# Patient Record
Sex: FEMALE | Race: WHITE | NOT HISPANIC OR LATINO | Employment: STUDENT | URBAN - METROPOLITAN AREA
[De-identification: names, ages, dates, MRNs, and addresses within clinical notes are randomized per-mention and may not be internally consistent; named-entity substitution may affect disease eponyms.]

---
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
## 2024-01-08 ENCOUNTER — VIRTUAL VISIT (OUTPATIENT)
Dept: GASTROENTEROLOGY | Facility: CLINIC | Age: 19
End: 2024-01-08
Attending: PHYSICIAN ASSISTANT
Payer: COMMERCIAL

## 2024-01-08 DIAGNOSIS — K59.09 CHRONIC CONSTIPATION: ICD-10-CM

## 2024-01-08 PROCEDURE — 99213 OFFICE O/P EST LOW 20 MIN: CPT | Mod: 95 | Performed by: PHYSICIAN ASSISTANT

## 2024-01-08 RX ORDER — LUBIPROSTONE 24 UG/1
24 CAPSULE ORAL 2 TIMES DAILY WITH MEALS
Qty: 180 CAPSULE | Refills: 1 | Status: SHIPPED | OUTPATIENT
Start: 2024-01-08 | End: 2024-03-14

## 2024-01-08 NOTE — PROGRESS NOTES
GASTROENTEROLOGY Follow-up VIDEO VISIT    CC/REFERRING MD:    Sabi Maher    REASON FOR CONSULTATION:   Ishmael Ceballos for   Chief Complaint   Patient presents with    RECHECK       HISTORY OF PRESENT ILLNESS:    Grecia Lombardo is a 18 year old female who is being evaluated via a billable video visit for follow-up.  To review, I have been following patient for chronic constipation for several months now.  She had failed over-the-counter laxative therapies and so we started her on standard dose Linzess earlier this year.  She had some improvement but there is still room for improvement and so we increased to maximum dose.  At last visit, she had really seen better stool output on high-dose Linzess and so we transitioned to high-dose Amitiza.  Things are about the same, stools every 2 to 3 days, typically formed, but not quite where she is hoping to get her stool pattern.  No other new symptoms to report or other concerns.      I have reviewed and updated the patient's Past Medical History, Social History, Family History and Medication List.    Exam:    General appearance:  Healthy appearing adult, in no acute distress  Eyes:  Sclera anicteric, Pupils round and reactive to light  Ears, nose, mouth and throat:  No obvious external lesions of ears and nose.  Hearing intact  Neck:  Symmetric, No obvious external lesions  Respiratory:  Normal respiration, no use of accessory muscles   MSK:  No visual upper extremity, neck or facial muscle atrophy  ABD:  No visual abdominal distention, no audible borborygmi  Skin:  No rashes or jaundice   Psychiatric:  Oriented to person, place and time, Appropriate mood and affect.   Neurologic:  Peripheral muscle function and dexterity appear to be intact      PERTINENT STUDIES have been reviewed.    ASSESSMENT/PLAN:    Grecia Lombardo is a 18 year old female who presents for follow up of chronic constipation.  Marginal improvement with secretory therapy, now on  high-dose Amitiza.  She is tolerating well for the most part, so we will continue on it and I will add an adjunctive, either MiraLAX or senna, patient's choice.  I did also offer a pelvic floor physical therapy, as we reviewed that pelvic floor dysfunction is common among patients with chronic constipation.  She will consider this.  Okay to follow-up with me in 6 weeks, sooner as needed.  If not any better at that point, we did discuss some alternative medication options, such as prucalopride, but reviewed that some of the other medication options carry more significant risks.  We did also review considering additional diagnostic testing.  I still think tests like CT and colonoscopy are probably of low yield, but we might want to consider them.  We also discussed motility testing, such as anorectal manometry and balloon defecography.  This could be accomplished at the pelvic floor center.  I will see patient in 6 weeks and we will review all this if needed.    1. Chronic constipation  - Physical Therapy Referral; Future  - lubiprostone (AMITIZA) 24 MCG capsule; Take 1 capsule (24 mcg) by mouth 2 times daily (with meals)  Dispense: 180 capsule; Refill: 1      Video-Visit Details    Video Visit Time: 10 minutes    Type of service:  Video Visit    Originating Location (pt. Location): Home    Distant Location (provider location):  Off-site    Platform used for Video Visit: momondo    A total of 16 minutes was spent with reviewing the chart, discussing with the patient, documentation and coordination of care.    Ishmael Ceballos PA-C  Division of Gastroenterology, Hepatology, and Nutrition  [facility]  [phone]    RTC 6 weeks

## 2024-01-08 NOTE — NURSING NOTE
Is the patient currently in the state of [place]? YES    Visit mode:VIDEO    If the visit is dropped, the patient can be reconnected by: VIDEO VISIT: Text to cell phone:   Telephone Information:   Mobile [phone]       Will anyone else be joining the visit? NO  (If patient encounters technical issues they should call [phone]    How would you like to obtain your AVS? MyChart    Are changes needed to the allergy or medication list? No    Reason for visit: RECHECK    Aria CHUF

## 2024-01-08 NOTE — PATIENT INSTRUCTIONS
It was nice seeing you again today.  As we discussed, our plan will be to have you continue on Amitiza 24 mcg twice daily.  I sent a new prescription to your pharmacy.  I think you would benefit from using one of the over-the-counter laxatives on a daily basis for adjunctive therapy as well.  I would pick either 1 capful MiraLAX daily or 1 to 2 tablets of senna daily.  The goal will be to have more frequent stool output, on a daily or every other day basis, with continued formed but easily passed stools.  We also discussed the benefits of pelvic floor health and pelvic floor physical therapy.  I placed a referral for this.  You should be hearing from the physical therapy office within the next couple of days to offer some appointment times.    Lets recheck on things in 6 weeks or so.  You are welcome to reach out to me sooner over the MyChart with any questions.

## 2024-03-13 ENCOUNTER — VIRTUAL VISIT (OUTPATIENT)
Dept: GASTROENTEROLOGY | Facility: CLINIC | Age: 19
End: 2024-03-13
Attending: PHYSICIAN ASSISTANT
Payer: COMMERCIAL

## 2024-03-13 DIAGNOSIS — K59.09 CHRONIC CONSTIPATION: Primary | ICD-10-CM

## 2024-03-13 PROCEDURE — 99213 OFFICE O/P EST LOW 20 MIN: CPT | Mod: 95 | Performed by: PHYSICIAN ASSISTANT

## 2024-03-13 ASSESSMENT — PAIN SCALES - GENERAL: PAINLEVEL: NO PAIN (0)

## 2024-03-13 NOTE — PROGRESS NOTES
GASTROENTEROLOGY Follow-up VIDEO VISIT    CC/REFERRING MD:    Sabi Maher    REASON FOR CONSULTATION:   Ishmael Ceballos for   Chief Complaint   Patient presents with    Follow Up     2 month follow up for chronic constipation.       HISTORY OF PRESENT ILLNESS:    Grecia Lombardo is a 19 year old female who is being evaluated via a billable video visit for follow-up.  Most recent visit with me was a couple months ago.  To review, I have been following this patient for chronic constipation for the past several months.  She had failed over-the-counter laxative therapy so we started her on standard of Linzess this past fall.  There was some initial improvement but it seemed to decrease after a while, despite increasing to high-dose.  From there, we transitioned to high-dose Amitiza.  Again, initially had some improvement, but then her pattern reverted back to having stools every 2 to 3 days.  At last visit, I had recommended adding an adjunctive on, such as MiraLAX or senna.  Despite this, she still has an adequate stool output.  She is more interested in trying to treat the problem holistically with dietary or lifestyle modification.  We had previously discussed pelvic floor testing and considering some additional diagnostic testing such as CT or colonoscopy.      I have reviewed and updated the patient's Past Medical History, Social History, Family History and Medication List.    Exam:    General appearance:  Healthy appearing adult, in no acute distress  Eyes:  Sclera anicteric, Pupils round and reactive to light  Ears, nose, mouth and throat:  No obvious external lesions of ears and nose.  Hearing intact  Neck:  Symmetric, No obvious external lesions  Respiratory:  Normal respiration, no use of accessory muscles   MSK:  No visual upper extremity, neck or facial muscle atrophy  ABD:  No visual abdominal distention, no audible borborygmi  Skin:  No rashes or jaundice   Psychiatric:  Oriented to  person, place and time, Appropriate mood and affect.   Neurologic:  Peripheral muscle function and dexterity appear to be intact      PERTINENT STUDIES have been reviewed.    ASSESSMENT/PLAN:    Grecia Lombardo is a 19 year old female who presents for follow up of chronic constipation.  Unfortunately, treatment with a few different secretagogues have been mostly ineffective.  At this point, she has more interested in pursuing dietary modification, so we will refer to nutrition to review high-fiber diet and otherwise to improve stool output.  Additional options could include pelvic floor physical therapy.  Will plan for follow-up in a couple months.  If dietary changes do not seem to be very effective, I would consider some pelvic floor testing and consider cross-sectional abdominal imaging and possibly colonoscopy.  Patient stated understanding of plan and I will see her in a couple months, sooner as needed.    1. Chronic constipation  - Nutrition Referral; Future      Video-Visit Details    Video Visit Time: 7 minutes    Type of service:  Video Visit    Originating Location (pt. Location): Home    Distant Location (provider location):  On-site    Platform used for Video Visit: William    A total of 13 minutes was spent with reviewing the chart, discussing with the patient, documentation and coordination of care.    Ishmael Ceballos PA-C  Division of Gastroenterology, Hepatology, and Nutrition  [facility]  [phone]    RT 2 months

## 2024-03-13 NOTE — NURSING NOTE
Chief Complaint   Patient presents with    Follow Up     2 month follow up for chronic constipation.     She requests these members of her care team be copied on today's visit information:  PCP: Sabi Maher    There were no vitals filed for this visit.  There is no height or weight on file to calculate BMI.    Medications were reconciled.        Saige Arredondo, CMA

## 2024-03-14 ENCOUNTER — OFFICE VISIT (OUTPATIENT)
Dept: FAMILY MEDICINE | Facility: CLINIC | Age: 19
End: 2024-03-14
Payer: COMMERCIAL

## 2024-03-14 VITALS
HEART RATE: 109 BPM | DIASTOLIC BLOOD PRESSURE: 78 MMHG | TEMPERATURE: 98.2 F | RESPIRATION RATE: 12 BRPM | HEIGHT: 68 IN | BODY MASS INDEX: 26.55 KG/M2 | OXYGEN SATURATION: 99 % | SYSTOLIC BLOOD PRESSURE: 121 MMHG | WEIGHT: 175.2 LBS

## 2024-03-14 DIAGNOSIS — Z00.00 ANNUAL PHYSICAL EXAM: Primary | ICD-10-CM

## 2024-03-14 DIAGNOSIS — N92.1 MENORRHAGIA WITH IRREGULAR CYCLE: ICD-10-CM

## 2024-03-14 DIAGNOSIS — Z11.3 SCREENING FOR STDS (SEXUALLY TRANSMITTED DISEASES): ICD-10-CM

## 2024-03-14 PROBLEM — J35.01 CHRONIC TONSILLITIS: Status: RESOLVED | Noted: 2022-01-07 | Resolved: 2024-03-14

## 2024-03-14 PROCEDURE — 99395 PREV VISIT EST AGE 18-39: CPT | Performed by: FAMILY MEDICINE

## 2024-03-14 PROCEDURE — 87591 N.GONORRHOEAE DNA AMP PROB: CPT | Performed by: FAMILY MEDICINE

## 2024-03-14 PROCEDURE — 87491 CHLMYD TRACH DNA AMP PROBE: CPT | Performed by: FAMILY MEDICINE

## 2024-03-14 RX ORDER — IBUPROFEN 600 MG/1
600 TABLET, FILM COATED ORAL EVERY 6 HOURS PRN
COMMUNITY

## 2024-03-14 RX ORDER — CLINDAMYCIN PHOSPHATE 10 UG/ML
LOTION TOPICAL
COMMUNITY

## 2024-03-14 RX ORDER — NORGESTIMATE AND ETHINYL ESTRADIOL 0.25-0.035
1 KIT ORAL DAILY
Qty: 84 TABLET | Refills: 3 | Status: SHIPPED | OUTPATIENT
Start: 2024-03-14

## 2024-03-14 RX ORDER — HYDROCODONE BITARTRATE AND ACETAMINOPHEN 5; 325 MG/1; MG/1
1 TABLET ORAL EVERY 6 HOURS PRN
COMMUNITY
End: 2024-03-14

## 2024-03-14 RX ORDER — TRETINOIN 0.25 MG/G
CREAM TOPICAL
COMMUNITY

## 2024-03-14 NOTE — PROGRESS NOTES
"  Assessment & Plan     Annual physical exam  Advised patient to continue with healthy lifestyle.    Screening for STDs (sexually transmitted diseases)  Advised patient to continue using condoms.  - Chlamydia trachomatis/Neisseria gonorrhoeae by PCR- VAGINAL SELF-SWAB; Future    Menorrhagia with irregular cycle  Controlled.  Refill Sprintec.  - SPRINTEC 28 0.25-35 MG-MCG tablet; Take 1 tablet by mouth daily            BMI  Estimated body mass index is 26.64 kg/m  as calculated from the following:    Height as of this encounter: 1.727 m (5' 8\").    Weight as of this encounter: 79.5 kg (175 lb 3.2 oz).   Weight management plan: Discussed healthy diet and exercise guidelines            Subjective   Grecia is a 19 year old, presenting for the following health issues:  Contraception (No concerns. Med refill. )      3/14/2024    10:28 AM   Additional Questions   Roomed by Nicholas RAMIREZ (MA Apprentice) & Area F. ()     Contraception    History of Present Illness       Reason for visit:  Check up    She eats 2-3 servings of fruits and vegetables daily.She consumes 0 sweetened beverage(s) daily.She exercises with enough effort to increase her heart rate 60 or more minutes per day.  She exercises with enough effort to increase her heart rate 5 days per week.   She is taking medications regularly.     Patient is a college student at [employer].  She walks 3 miles per day to get from her apartment to class at home.  She goes to the gym a few times a week to do stairstepper and weights.  At home she will do Pilates 1-2 times a week.    Menorrhagia: When patient started having her periods around the age of 12 she bled continuously for 7 months that caused anemia, so she had to be started on Sprintec, which has been working well for her.  She is here requesting a refill.  She is sexually active and states that she uses condoms every time.  Last chlamydia screening was 2 years ago.              Review of Systems  Constitutional, " "HEENT, cardiovascular, pulmonary, gi and gu systems are negative, except as otherwise noted.      Objective    /78 (BP Location: Left arm, Patient Position: Sitting, Cuff Size: Adult Regular)   Pulse 109   Temp 98.2  F (36.8  C) (Oral)   Resp 12   Ht 1.727 m (5' 8\")   Wt 79.5 kg (175 lb 3.2 oz)   LMP 03/06/2024 (Exact Date)   SpO2 99%   BMI 26.64 kg/m    Body mass index is 26.64 kg/m .  Physical Exam   GENERAL: alert and no distress  EYES: Eyes grossly normal to inspection, PERRL and conjunctivae and sclerae normal  HENT: ear canals and TM's normal, nose and mouth without ulcers or lesions  NECK: no adenopathy, no asymmetry, masses, or scars  RESP: lungs clear to auscultation - no rales, rhonchi or wheezes  CV: regular rate and rhythm, normal S1 S2, no S3 or S4, no murmur, click or rub, no peripheral edema  ABDOMEN: soft, nontender, no hepatosplenomegaly, no masses and bowel sounds normal  MS: no gross musculoskeletal defects noted, no edema  SKIN: no suspicious lesions or rashes  NEURO: Normal strength and tone, mentation intact and speech normal  PSYCH: mentation appears normal, affect normal/bright            Signed Electronically by: Francisco Kapadia MD    "

## 2024-03-15 ENCOUNTER — VIRTUAL VISIT (OUTPATIENT)
Dept: NUTRITION | Facility: CLINIC | Age: 19
End: 2024-03-15
Attending: PHYSICIAN ASSISTANT
Payer: COMMERCIAL

## 2024-03-15 DIAGNOSIS — K59.09 CHRONIC CONSTIPATION: Primary | ICD-10-CM

## 2024-03-15 LAB
C TRACH DNA SPEC QL PROBE+SIG AMP: NEGATIVE
N GONORRHOEA DNA SPEC QL NAA+PROBE: NEGATIVE

## 2024-03-15 PROCEDURE — 97802 MEDICAL NUTRITION INDIV IN: CPT | Mod: 95 | Performed by: DIETITIAN, REGISTERED

## 2024-03-15 NOTE — PROGRESS NOTES
Medical Nutrition Therapy  Visit Type: Initial assessment and intervention    Visit Details    How would you like to obtain your AVS? MyChart  If the correspondence for visit is dropped, how would you like your dietitian to reconnect with you:   call back by phone? Yes    Text to cell phone: [phone]  Will anyone else be joining your video visit or telephone call? No  {If patient encounters technical issues they should call [phone] :74    Type of service:  Video Visit     Start Time: 12:33 PM    End Time:1:36 PM    Originating Location (pt. Location): Home    Distant Location (provider location):  [facility] Nutrition Department -remote/offsite    Platform used for Video Visit: William      Referring Provider: Ishmael Ceballos  [facility]     REASON FOR REFERRAL:   Grecia Lombardo is a 19 year old female who is interested in Medical Nutrition Therapy (MNT) and education related to weight management and constipation.     She is accompanied by self.     Switched meds 4-5x over thel ast year and hasn't seen improvement with digestion and wants more natural ways to treat her issues. She was bleeding for 7 months monthly in 8th grade her period didn't stop and started supplement to avoid blood transfusion. Not having heavy flows now. In highschool would vape and stopped in college and was eating more dorm food and gaining weight. Noticed now that as she got her own apartment started losing weight and wants to feel more healthy no weight goal.      Takes brand one a day multi. Target generic brand taking vitron-c iron 65mg daily since 13, nature made omega 3, magnesium 400mg and biotin natural. Not strictly lacto-ovo vegan primarily but once every 6 months or so will have poultry. Sometimes ground beef or turkey in tacos every 4 months or so.      https://www.K[employer]ist/vitamins/vitamins-for-women/vitacraves-gummy-womens-multivitamin    https://www.[employer].com/zscupb-j-cklawm-ingredients-and-other-product-information#ingredients    Discussed discontinuing iron, magnesium and biotin     Breakfast; marni seed pudding with fruit or eggs with cottage cheese     Hummus with carrots lunch or protein yogurt for marni seed pudding or string cheese     Dinner: tofu and beans or quinoa salad with cucumbers or tomatoes, peppers beans and tofu   NUTRITION ASSESSMENT:       3/15/2024    12:10 PM   Nutritional Goals   Nutritional Goal Create a plan to lose weight;Manage Digestive Issues;Increase energy;What to eat for my specific health concerns             No data to display                Failed to redirect to the Timeline version of the [employer] SmartLink.        No data to display                   3/15/2024    12:10 PM   Gastrointestinal   Constipation Current   Nausea or Vomiting Current   Diarrhea Current   Gas/bloating Current            No data to display                    No data to display                    No data to display                    No data to display                    No data to display                   3/15/2024    12:10 PM   Psychological   Depression/Anxiety Past           No data to display                   3/15/2024    12:10 PM   Womens Health Assessment   Hysterectomy No   I am pregnant.  No   I am breastfeeding. No   I want to become pregnant within the next year . No   What do you use for contraception?  condoms;birth control pill   Any problems with current birth control method?   No   Date of last menstrual period 3/10/2024   Are your periods irregular? No   Days from the start of one period to the next. 28   Days of Menstral Flow 4   Associated with menstral periods. Heavy bleeding;Water retention;Diarrhea/constipation   Are you officially in menopause? (no period for one year or longer)  No        Past Medical History:  Past Medical  History:   Diagnosis Date    Menorrhagia with irregular cycle     Improved with OCP.  Evaluation by Dr. Benjamin at [facility] at age 13.     Visual impairment     Drusen on opthalmology exam, normal opthalmologic ultrasound       Previous Surgeries:   Past Surgical History:   Procedure Laterality Date    TONSILLECTOMY  2021        Family History:  Family History   Problem Relation Age of Onset    No Known Problems Mother     No Known Problems Father         Lifestyle History:      3/15/2024    12:10 PM   Lifestyle   Do you feel your life is stressful right now?  Yes   What is the cause(s) of stress in your life?  Multi-tasking and multiple deadlines to meet   Are you currently implementing any strategies to help manage stress? Yes   What are you doing to manage stress?  Breathing exercises;Movement and exercise;Counseling;Take time for self        Exercise History:      3/15/2024    12:10 PM   Exercise   Does your occupation require extended periods of sitting?  No   Does your occupation require extended periods of repetitive movements (ex: walking or lifting)?  No   Do you currently participate in any forms of exercise? Yes   Check all the exercises you participate in: Walking;Yoga;Weight Lifting   How many times per week do you exercise? 5 times   How long do you usually exercise? 60 min        Sleep History:      3/15/2024    12:10 PM   Sleep   How many hours (on average) do you sleep per night? 4-6   What time do you turn off the lights? 10 PM   How long does it take for you to fall asleep? 1 hour   What time do you stop using electronic devices? 10 PM   What time do you wake up? 7 AM   When do you eat your first meal?  8 AM   Do you feel well-rested during the day?  No   Do you take naps?  No   Do you have a comfortable bedroom environment (cool, quiet, dark, etc)? Yes   Do you have a sleep routine/ ritual that you do before bed?  Yes   How many hours do you spend per day looking at a screen (TV, computer,  tablet and phone)? 5 to 6   Select all factors that apply to your current sleep habits: Difficulty falling asleep;Have difficulty waking up in the morning;Wake up in the middle of the night;Feel tired/sluggish/fatigued during the day;Have tried supplements (ie: melatonin) for sleep;Leg twitching at night        Nutrition History:      3/15/2024    12:10 PM   Nutrition   Have you ever had a nutrition consultation? No   Do you currently follow a special diet or nutritional program? No   What do you feel are the biggest barriers getting in the way of achieving you nutritional goals? Lack of nutrition knowledge;Stress;Transportation issues   Do you have any food allergies, sensitivities or intolerances?  No           3/15/2024    12:10 PM   Digestion   Do you experience stomach pains/cramping? 2-3 times per week   Do you experience bloating?  Daily   Do you experience gas?  Daily   Do you experience heartburn/acid reflux/indigestion? Never   How often do you have a bowel movement? Once per week or less   What is a typical bowel movement like for you? Select all that apply: Hard to pass;Varies a lot;Liquid;Alternates between loose and hard          3/15/2024    12:10 PM   Food Access:    Who does the grocery shopping? Self   How often is grocery shopping done? Every 2 weeks   Where do you usually receive your groceries from? Select all that apply: Target; [employer]   Do you read food labels? No   Who does the cooking? Select all that apply: Self   How many meals do you eat out per week?  0 to 1   What restaurants do you typically choose? [employer] (Chipotle, [employer], etc.)          3/15/2024    12:10 PM   Daily Patterns:   How many days per week do you have breakfast? 7   How many days per week do you have lunch? 5   How many days per week do you have dinner? 7   How many days per week do you have snacks? 6          3/15/2024    12:10 PM   Protein Intake:   How many times per day do you typically consume a protein  source(s)? 1   What types of protein do you currently eat?  Eggs;Beans;Tofu           3/15/2024    12:10 PM   Fat Intake:    How many times per day do you typically consume healthy fat(s)? 1   What types of health fats do you currently eat? Select all that apply:  Almonds;Cashews;Oscar seeds;Flax seeds;Peanut butter;Olive oil           3/15/2024    12:10 PM   Fruit Intake:    How many times per day do you typically consume fruits? 1   What types of fruit do currently eat? Apple;Banana;Blueberries;Cherries;Grapes;Kiwi;Mandarin oranges;Raspberries;Tangerines;Watermelon           3/15/2024    12:10 PM   Vegetable Intake:    How many times per day do you typically consume vegetables? 2   What types of vegetables do you currently eat? Beans;Broccoli;Cabbage;Carrots;Cauliflower;Celery;Corn;Cucumber;Greens (jaelyn, kale etc);Onions;Potato (baked, boiled, mashed, French fries);Sugar snap peas;Tomato          3/15/2024    12:10 PM   Grain Intake:    How many times per day do you typically consume grains? 1   What types of grains do currently eat? Select all that apply:  Quinoa (gluten free);White rice (gluten free);Bagel (non-gluten free);Breads (non-gluten free);Cereals (non-gluten free);Chips (non-gluten free);Crackers (non-gluten free);Muffins (non-gluten free);Pancakes/waffles (non-gluten free);Pasta (non-gluten free)           3/15/2024    12:10 PM   Dairy Intake:    How many times per day do you typically consume dairy? 1   What types of dairy do currently eat? Select all that apply:  Milk;Cheese;Yogurt           3/15/2024    12:10 PM   Non-Dairy Alternative Intake:    How many times per day do you typically consume non-dairy alternatives? 0   What types of non-dairy alternatives do currently eat? Select all that apply:  Urich milk;Oat milk           3/15/2024    12:10 PM   Sweets Intake:    How many times per day do you typically consume sweets? 1          3/15/2024    12:10 PM   Beverage Intake:    How many 8 oz  cups of water do you typically consume per day?  8 or more   How many 8 oz cups of caffeine do you typically consume per day?  1 to 2   How many drinks of alcohol do you typically consume per week (1 drink = 5 oz wine, 12 oz beer, 1.5 oz spirits)?   4 to 7           3/15/2024    12:10 PM   Lifestyle Recall:    What time did you wake up? 7 AM   What time did you go to sleep? 11 PM   What time did you have breakfast? 7-8 AM   Where did you have breakfast?  Home   What time did you have a morning snack? No snack   What time did you have lunch? 12-1 PM   Where did you have lunch?  School   What time did you have an afternoon snack? 4-5 PM   Where did you have your afternoon snack? Home   What time did you have dinner? 6-7 PM   Where did you have dinner?  Home   What time did you have an evening snack? 8-9 PM   Where did you have your evening snack? Home   What time of day did you exercise? 7 AM   Where did you exercise? Home          Additional concerns:    MEDICATIONS:  Current Outpatient Medications   Medication Sig Dispense Refill    biotin 1000 MCG TABS tablet       clindamycin (CLEOCIN T) 1 % external lotion APPLY TO FACE IN THE MORNING TOPICALLY      fish oil-omega-3 fatty acids 1000 MG capsule       ibuprofen (ADVIL/MOTRIN) 600 MG tablet Take 600 mg by mouth every 6 hours as needed      iron,carbonyl-vitamin C (VITRON-C) 65 mg iron- 125 mg TbEC [IRON,CARBONYL-VITAMIN C (VITRON-C) 65 MG IRON- 125 MG TBEC] Take by mouth.      multivitamin (MULTIPLE VITAMIN ORAL) [MULTIVITAMIN (MULTIPLE VITAMIN ORAL)]       ondansetron (ZOFRAN ODT) 4 MG ODT tab Take 1 tablet (4 mg) by mouth every 6 hours as needed for nausea or vomiting 12 tablet 0    SPRINTEC 28 0.25-35 MG-MCG tablet Take 1 tablet by mouth daily 84 tablet 3    tretinoin (RETIN-A) 0.025 % external cream 1 APPLICATION AT BEDTIME TOPICALLY TO FACE             3/15/2024    12:10 PM   Dietary Supplements   Individual Vitamin Supplements C;General multivitamin  "  Individual Mineral Supplements Iron;Magnesium         ALLERGIES:   No Known Allergies     .na  LABS:  Last Basic Metabolic Panel:  Lab Results   Component Value Date     02/10/2022     09/24/2021     08/26/2021      Lab Results   Component Value Date    POTASSIUM 3.4 02/10/2022    POTASSIUM 4.3 09/24/2021    POTASSIUM 4.0 08/26/2021     Lab Results   Component Value Date    CHLORIDE 102 02/10/2022    CHLORIDE 104 09/24/2021    CHLORIDE 107 08/26/2021     Lab Results   Component Value Date    WILNER 9.2 02/10/2022    WILNER 10.3 09/24/2021    WILNER 9.5 08/26/2021     Lab Results   Component Value Date    CO2 23 02/10/2022    CO2 25 09/24/2021    CO2 21 08/26/2021     Lab Results   Component Value Date    BUN 10 02/10/2022    BUN 7 09/24/2021    BUN 7 08/26/2021     Lab Results   Component Value Date    CR 0.68 02/10/2022    CR 0.63 09/24/2021    CR 0.67 08/26/2021     Lab Results   Component Value Date     02/10/2022    GLC 83 09/24/2021    GLC 65 08/26/2021       Last Glucose Profile:   No results found for: \"A1C\"    Last Lipid Profile:   Cholesterol   Date Value Ref Range Status   07/30/2020 180 (H) <=169 mg/dL Final     Direct Measure HDL   Date Value Ref Range Status   07/30/2020 78 >45 mg/dL Final     LDL Cholesterol Calculated   Date Value Ref Range Status   07/30/2020 90 <=109 mg/dL Final     Triglycerides   Date Value Ref Range Status   07/30/2020 61 <=89 mg/dL Final     No results found for: \"CHOLHDLRATIO\"    Most recent CBC:  Recent Labs   Lab Test 02/10/22  2055 01/27/22  1532 08/26/21  1019   WBC 15.2* 7.8 5.3   HGB 12.6 12.2 12.7   HCT 39.2 37.5 38.5    289 290     Most recent hepatic panel:  Recent Labs   Lab Test 08/26/21  1019   ALT 18   AST 20     Most recent creatinine:  Recent Labs   Lab Test 02/10/22  2055 09/24/21  0820   CR 0.68 0.63       No components found for: \"GFRESETIMATED\"LASTLAB(gfrestblack:1@  Lab Results   Component Value Date    ALBUMIN 3.8 08/26/2021 " "      Last Thyroid Profile:   TSH   Date Value Ref Range Status   03/15/2023 1.41 0.50 - 4.30 uIU/mL Final       Last Mineral Profile:   Ferritin   Date Value Ref Range Status   08/26/2021 27 6 - 40 ng/mL Final     Iron   Date Value Ref Range Status   06/05/2019 66 42 - 175 ug/dL Final       Autoimmune & Inflammatory   No results found for: \"CRP\"      Last Vitamin Profile:   No results found for: \"PWV182\", \"PLEE560\", \"NHDQ54LHYVE\", \"VITD3\", \"D2VIT\", \"D3VIT\", \"DTOT\", \"SI94826223\", \"[id number]\", \"[id number]\", \"FO80861255\", \"BF81735291\", \"[id number]\"    ANTHROPOMETRICS:  Vitals:   BP Readings from Last 1 Encounters:   03/14/24 121/78     Pulse Readings from Last 1 Encounters:   03/14/24 109     Estimated body mass index is 26.64 kg/m  as calculated from the following:    Height as of 3/14/24: 1.727 m (5' 8\").    Weight as of 3/14/24: 79.5 kg (175 lb 3.2 oz).    Wt Readings from Last 5 Encounters:   03/14/24 79.5 kg (175 lb 3.2 oz)   03/13/23 63.5 kg (140 lb) (71%, Z= 0.56)*   02/10/22 63.5 kg (140 lb) (75%, Z= 0.68)*   01/27/22 65.8 kg (145 lb) (80%, Z= 0.85)*   12/10/21 63 kg (138 lb 14.4 oz) (74%, Z= 0.65)*     * Growth percentiles are based on CDC (Girls, 2-20 Years) data.       NUTRITION DIAGNOSIS:   1. Micronutrient imbalances related to following lacto-ovo vegetarian diet and supplementing with possible un bioavailable nutrients (such as Vitamin C (as asorbic acid) 125 mg 140%  Elemental Iron 65mg (as carbonyl iron) as evidenced by constipation but normal iron levels (previous history of low iron in past).       NUTRITION INTERVENTION:   Nutrition Education (Application):  Core Food Plan Materials by [employer]     Discussed healthy fibers    Reviewed current diet and supplement brand intake for excess intake and not enough intake   Micronutrients seem to impact the flow of water into and out of the GI tract, which can affect the consistency of stool. Micronutrients may also affect the " rate of movement, or motility,    throughout the GI tract. Each specific micronutrient plays an important role in healthy digestion, so imbalances in any of them will affect the GI tract in a particular way, potentially leading to constipation.    Reviewed and planned out some recipes for breakfast and dinner      Discussed meal planning ayaz and foods on budget grocery shopping    Long Term Goals:   Goal: [place] Stool type 4 - formed and soft and decrease constipation   Goal: Maintain a healthy balanced diet - increase protein and address potential nutrient deficiencies following lacto-ovo vegetarian diet.         Short Term Goals:  Goal 1: Focus on having a healthy balanced breakfast daily with protein. Try the hemp protein powder for extra protein and fiber.   https://[employer]/collections/hemp-yeah-protein-powder/products/hemp-yeah-max-fiber-unsweetened    Incorporate a whole grain at breakfast ex oatmeal naturally gluten free with more healthy fats such as handful of walnuts and 1 fruit serving 15g of carbs such as blueberries. Try a lean protein on side such as 2 eggs, 2 turkey or chicken sausage.    Try a smoothie 1-2x a week for breakfast for the next six weeks see recipe discussed. add in unsweetened flax, hemp or coconut milk, 1 tbsp of healthy fat such as flax seed ground or marni seed ground. serving of fruit 1 cup of berries. Veggie (optional), 1 scoop of plant based protein powder or collagen powder, 1 serving of fruit and veggie such as kale, spinach, or veggie powder even spirulina     Avocado on slice of gluten free bread recommend [employer] Solution brand      https://[employer]/?[email or web address]     Birch rojo brand for pancakes - paleo  https://[employer]/collections/pancake-waffle-mix/products/paleo     Try siete soft taco shells with eggs and veggies in am for breakfast    https://[employer]/collections/tortillas/products/tsucpz-ehzfj-zluixmobd-6-pack     Goal 2:  Check out the meal planning ayaz - Try to plan two-three new dinners from mealime.com and make 4 servings so 2 can be leftovers for lunch.     Goal 3: Choose foods high in fiber: Aim for at least 5 grams of fiber per serving of food or a total of 25-35 grams fiber per day. Remember, when looking at the label, you can take the fiber away from the total carbs. Ex:15g of total carbs - 4g of fiber = 11g net carbs     Insoluble fiber acts like a bulky  inner broom,  sweeping out debris from the intestine and creating more motility and movement.      Soluble fiber attracts water and swells, creating a gel that slows digestion.  Also, slows the release of glucose from foods into the blood which stops spikes in blood sugar levels.  Soluble fiber traps toxins in the gut, helping to carry them to excretion and provides healthy bacteria in the digestive tract.      Goal 4: Recommend stopping supplements magnesium, iron, omega and get labs tested.   Start the following supplements to see if that helps with constipation.   One Multivitamin by Pure encapsulations - 1 capsule per day with food   Donora Naturals ultimate omega 3 -2 capsules daily with food   Start natural vitality calm magnesium - Add chamomile tea with magnesium at night before bed. Star with 1 tsp and increase as needed. Add another tsp in am to your smoothie before going to school.      Labs recommended:   Vitamin B1, vitamin B6, vitamin B12, vitamin D, calcium, iodine, iron, ferritin, and zinc in your blood to help detect any deficiencies common to a vegetarian diet.      How Do Micronutrient Imbalances Cause Constipation?  Micronutrients seem to impact the flow of water into and out of the GI tract, which can affect the consistency of stool. Micronutrients may also affect the rate of movement, or motility, throughout the GI tract. Each specific micronutrient plays  an important role in healthy digestion, so imbalances in any of them will affect the GI tract in a particular way, potentially leading to constipation.    Which Micronutrient Imbalances Are Associated With Constipation?  Micronutrients can be found in both low and high levels, and we see both excess and deficiencies of certain micronutrients leading to constipation.    Micronutrient Excess That Can Cause Constipation.  Micronutrient excess can cause constipation through various mechanisms such as reducing gut motility, altering the water balance in the intestine, and interfering with the absorption of other essential nutrients. Here are some examples of how specific micronutrients in excess can cause constipation:    Iron Excess  Iron is a mineral required for proper development and growth. Iron is essential for the creation of hemoglobin, the part of the red blood cell that carries oxygen around the body. Iron deficiencies can be common, especially in pregnant women, and supplementation of iron is often required. One of the most common side effects of iron supplementation is constipation, as it is thought that iron pulls water away from the GI tract, causing hard stools. Iron can come in many forms, with ferrous/ferric sulfate, ferrous/ferric gluconate, and ferrous/ferric fumarate being the most commonly found in supplements. However, other types of iron are available, including ferrous glycine sulfate (ferrous glycinate or ferrous bisglycinate), iron protein succinylate, and ferric citrate, and might be a better option as they are gentler on the GI tract and are not as constipating as the former types of iron mentioned.    Calcium Excess  Calcium is the most common mineral found in the body. It is essential for strong bones, muscular contractions, nerve signaling, hormone release, and blood flow. However, calcium's ability to contract muscles can act on the muscles of the GI tract and slow the transit of stool,  leading to constipation. Like iron, different types of calcium may affect the GI differently. Calcium in the form of calcium carbonate has been labeled as the most constipating type of calcium.    Vitamin D Excess  It is estimated that 40% of Americans are deficient in vitamin D. Vitamin D deficiency can lead to bone pain, impaired immune function, mood swings, low energy levels, and more. Because vitamin D is primarily made from the sun, vitamin D supplementation in northern regions is quite common. In cases of over-supplementation, excessive vitamin D can lead to hypercalcemia or too much calcium in the blood. As discussed above, elevated calcium can cause constipation, and thus elevated vitamin D can also lead to constipation.    Zinc Excess  Excess zinc intake can cause constipation by interfering with the absorption of other essential nutrients, such as copper and iron, which are important for maintaining bowel regularity. Sources of zinc include oysters, red meat, poultry, beans, nuts, and fortified cereals.    Micronutrient Deficiencies That Can Cause Constipation  Micronutrient deficiencies can cause constipation by affecting the proper functioning of the digestive system. Here are some examples of how specific micronutrient deficiencies can cause constipation:    Vitamin D Deficiency  While excess vitamin D can cause constipation, so can low vitamin D. Vitamin D may aid in gastric motility or movement of the muscles in the GI tract. Deficiencies, thus, may lead to the slowing of the movement in the GI, causing constipation.    Thiamine/B1 Deficiency  Thiamine, or vitamin B1, is essential for cellular growth and development. This vitamin is found in whole grains, nuts, seeds, pork, and legumes. Thiamine deficiencies are considered rare in the [place] but occasionally do occur. In the presence of a B1 deficiency, cells in the pancreas will greatly reduce the amount of digestive enzymes they release, which can  slow down the digestive tract and lead to constipation.    Potassium Deficiency  Potassium is a mineral used by every cell in the body, as it plays an important role in cellular fluidity and chemical signaling. A potassium deficiency can cause motility in the GI tract to slow, and in severe cases, it can cause it to stop completely.    Magnesium Deficiency  Magnesium is utilized in over 300 reactions in the body, making it an abundant mineral. Magnesium is widely used in the treatment of constipation, but a study on over 3,000 [place] women showed an association between low magnesium status and a higher prevalence of constipation. Magnesium helps to pull water into the stool, softening it while also stimulating contractions in the GI tract, causing movement of the stool.    Vitamin C Deficiency  Vitamin C deficiency can cause constipation by reducing the production of collagen, which is necessary for the proper functioning of the intestinal lining. Vitamin C also acts as a natural laxative by increasing the water content in the stool. Sources of vitamin C include citrus fruits, berries, kiwi, peppers, and broccoli.      Nutrition for Constipation  The Mediterranean Diet can be a great way to eat to avoid micronutrient deficiencies and relieve constipation. The Mediterranean Diet consists of whole foods, unprocessed grains, and healthy fats. It typically includes foods that contain an array of micronutrients, including fresh fruits and vegetables, legumes, herbs, nuts, seeds, olive oil, and fatty fish. Followers of the diet are encouraged to eat locally and seasonally, as foods consumed in this fashion tend to be more nutrient dense. The Mediterranean Diet is also high in fibrous foods, which can help to bulk stool and relieve constipation.    Hydration for Constipation  Water intake is essential to avoid constipation. Dehydration causes water to be pulled from the intestines and into the circulation, leaving hard,  dry stools. Additionally, water is needed to properly absorb micronutrients, which can, in turn, affect constipation.      Supplements for Constipation  Digestive enzymes are naturally produced by the stomach, liver, and pancreas and function to break down foods. If markers of digestion are low, digestive enzyme supplementation may be necessary to aid in the breakdown and absorption of food.    Intestinal permeability  L-glutamine is an amino acid and the primary fuel source for small intestine cells. Glutamine has been shown to strengthen the tight junctions that hold the intestinal cells together, lowering intestinal permeability.         NUTRITION RESOURCES:  IFM Core Packet - IFM Mediterranean diet   Functional Nutrition Fundamentals   Comprehensive Guide  Meal plan with recipes   [email or web address]  [email or web address]  [email or web address]  [email or web address]          PATIENT'S BEHAVIOR CHANGE GOALS:   See nutrition intervention for patient stated behavior change goals. AVS and nutrition handouts were sent to patient by [employer].     MONITOR / EVALUATE:  Registered Dietitian will monitor/evaluate the following:   Beliefs and attitudes related to food  Food and nutrition knowledge / skills  Food / Beverage / Nutrient intake   Pertinent Labs  Progress toward meeting stated nutrition-related goals  Readiness to change nutrition-related behaviors  Weight change  Digestion     COORDINATION OF CARE:  Follow up with gastroenterology      FOLLOW-UP:  Follow-up appointment scheduled on May 15th 10am video visit     Time spent in minutes: 61 minutes 4 units   Encounter: Individual    Brittni Lassiter RD, CLT, LD  Integrative Registered Dietitian

## 2024-03-15 NOTE — PATIENT INSTRUCTIONS
NUTRITION INTERVENTION:   Nutrition Education (Application):  Core Food Plan Materials by [employer] [place] [employer]     Discussed healthy fibers    Reviewed current diet and supplement brand intake for excess intake and not enough intake   Micronutrients seem to impact the flow of water into and out of the GI tract, which can affect the consistency of stool. Micronutrients may also affect the rate of movement, or motility,    throughout the GI tract. Each specific micronutrient plays an important role in healthy digestion, so imbalances in any of them will affect the GI tract in a particular way, potentially leading to constipation.    Reviewed and planned out some recipes for breakfast and dinner      Discussed meal planning ayaz and foods on budget grocery shopping    Long Term Goals:   Goal: [place] Stool type 4 - formed and soft and decrease constipation   Goal: Maintain a healthy balanced diet - increase protein and address potential nutrient deficiencies following lacto-ovo vegetarian diet.         Short Term Goals:  Goal 1: Focus on having a healthy balanced breakfast daily with protein. Try the hemp protein powder for extra protein and fiber.   https://[employer]/collections/hemp-yeah-protein-powder/products/hemp-yeah-max-fiber-unsweetened    Incorporate a whole grain at breakfast ex oatmeal naturally gluten free with more healthy fats such as handful of walnuts and 1 fruit serving 15g of carbs such as blueberries. Try a lean protein on side such as 2 eggs, 2 turkey or chicken sausage.    Try a smoothie 1-2x a week for breakfast for the next six weeks see recipe discussed. add in unsweetened flax, hemp or coconut milk, 1 tbsp of healthy fat such as flax seed ground or marni seed ground. serving of fruit 1 cup of berries. Veggie (optional), 1 scoop of plant based protein powder or collagen powder, 1 serving of fruit and veggie such as kale, spinach, or veggie powder even spirulina      Avocado on slice of gluten free bread recommend base culture brand      https://[email or web address]/?gclid=[email or web address]     Birch rojo brand for pancakes - paleo  https://[employer]collections/pancake-waffle-mix/products/paleo     Try siete soft taco shells with eggs and veggies in am for breakfast   https://[employer]/tortillas/products/oqhkmc-ddfad-ffilltwkw-6-pack     Goal 2:  Check out the meal planning ayaz - Try to plan two-three new dinners from mealime.com and make 4 servings so 2 can be leftovers for lunch.     Goal 3: Choose foods high in fiber: Aim for at least 5 grams of fiber per serving of food or a total of 25-35 grams fiber per day. Remember, when looking at the label, you can take the fiber away from the total carbs. Ex:15g of total carbs - 4g of fiber = 11g net carbs     Insoluble fiber acts like a bulky  inner broom,  sweeping out debris from the intestine and creating more motility and movement.      Soluble fiber attracts water and swells, creating a gel that slows digestion.  Also, slows the release of glucose from foods into the blood which stops spikes in blood sugar levels.  Soluble fiber traps toxins in the gut, helping to carry them to excretion and provides healthy bacteria in the digestive tract.      Goal 4: Recommend stopping supplements magnesium, iron, omega and get labs tested.   Start the following supplements to see if that helps with constipation.   One Multivitamin by Pure encapsulations - 1 capsule per day with food   [place] Naturals ultimate omega 3 -2 capsules daily with food   Start natural vitality calm magnesium - Add chamomile tea with magnesium at night before bed. Star with 1 tsp and increase as needed. Add another tsp in am to your smoothie before going to school.      Labs recommended:   Vitamin B1, vitamin B6, vitamin B12, vitamin D, calcium, iodine, iron,  ferritin, and zinc in your blood to help detect any deficiencies common to a vegetarian diet.      How Do Micronutrient Imbalances Cause Constipation?  Micronutrients seem to impact the flow of water into and out of the GI tract, which can affect the consistency of stool. Micronutrients may also affect the rate of movement, or motility, throughout the GI tract. Each specific micronutrient plays an important role in healthy digestion, so imbalances in any of them will affect the GI tract in a particular way, potentially leading to constipation.    Which Micronutrient Imbalances Are Associated With Constipation?  Micronutrients can be found in both low and high levels, and we see both excess and deficiencies of certain micronutrients leading to constipation.    Micronutrient Excess That Can Cause Constipation.  Micronutrient excess can cause constipation through various mechanisms such as reducing gut motility, altering the water balance in the intestine, and interfering with the absorption of other essential nutrients. Here are some examples of how specific micronutrients in excess can cause constipation:    Iron Excess  Iron is a mineral required for proper development and growth. Iron is essential for the creation of hemoglobin, the part of the red blood cell that carries oxygen around the body. Iron deficiencies can be common, especially in pregnant women, and supplementation of iron is often required. One of the most common side effects of iron supplementation is constipation, as it is thought that iron pulls water away from the GI tract, causing hard stools. Iron can come in many forms, with ferrous/ferric sulfate, ferrous/ferric gluconate, and ferrous/ferric fumarate being the most commonly found in supplements. However, other types of iron are available, including ferrous glycine sulfate (ferrous glycinate or ferrous bisglycinate), iron protein succinylate, and ferric citrate, and might be a better option as  they are gentler on the GI tract and are not as constipating as the former types of iron mentioned.    Calcium Excess  Calcium is the most common mineral found in the body. It is essential for strong bones, muscular contractions, nerve signaling, hormone release, and blood flow. However, calcium's ability to contract muscles can act on the muscles of the GI tract and slow the transit of stool, leading to constipation. Like iron, different types of calcium may affect the GI differently. Calcium in the form of calcium carbonate has been labeled as the most constipating type of calcium.    Vitamin D Excess  It is estimated that 40% of Americans are deficient in vitamin D. Vitamin D deficiency can lead to bone pain, impaired immune function, mood swings, low energy levels, and more. Because vitamin D is primarily made from the sun, vitamin D supplementation in northern regions is quite common. In cases of over-supplementation, excessive vitamin D can lead to hypercalcemia or too much calcium in the blood. As discussed above, elevated calcium can cause constipation, and thus elevated vitamin D can also lead to constipation.    Zinc Excess  Excess zinc intake can cause constipation by interfering with the absorption of other essential nutrients, such as copper and iron, which are important for maintaining bowel regularity. Sources of zinc include oysters, red meat, poultry, beans, nuts, and fortified cereals.    Micronutrient Deficiencies That Can Cause Constipation  Micronutrient deficiencies can cause constipation by affecting the proper functioning of the digestive system. Here are some examples of how specific micronutrient deficiencies can cause constipation:    Vitamin D Deficiency  While excess vitamin D can cause constipation, so can low vitamin D. Vitamin D may aid in gastric motility or movement of the muscles in the GI tract. Deficiencies, thus, may lead to the slowing of the movement in the GI, causing  constipation.    Thiamine/B1 Deficiency  Thiamine, or vitamin B1, is essential for cellular growth and development. This vitamin is found in whole grains, nuts, seeds, pork, and legumes. Thiamine deficiencies are considered rare in the [place] but occasionally do occur. In the presence of a B1 deficiency, cells in the pancreas will greatly reduce the amount of digestive enzymes they release, which can slow down the digestive tract and lead to constipation.    Potassium Deficiency  Potassium is a mineral used by every cell in the body, as it plays an important role in cellular fluidity and chemical signaling. A potassium deficiency can cause motility in the GI tract to slow, and in severe cases, it can cause it to stop completely.    Magnesium Deficiency  Magnesium is utilized in over 300 reactions in the body, making it an abundant mineral. Magnesium is widely used in the treatment of constipation, but a study on over 3,000 [place] women showed an association between low magnesium status and a higher prevalence of constipation. Magnesium helps to pull water into the stool, softening it while also stimulating contractions in the GI tract, causing movement of the stool.    Vitamin C Deficiency  Vitamin C deficiency can cause constipation by reducing the production of collagen, which is necessary for the proper functioning of the intestinal lining. Vitamin C also acts as a natural laxative by increasing the water content in the stool. Sources of vitamin C include citrus fruits, berries, kiwi, peppers, and broccoli.      Nutrition for Constipation  The Mediterranean Diet can be a great way to eat to avoid micronutrient deficiencies and relieve constipation. The Mediterranean Diet consists of whole foods, unprocessed grains, and healthy fats. It typically includes foods that contain an array of micronutrients, including fresh fruits and vegetables, legumes, herbs, nuts, seeds, olive oil, and fatty fish. Followers of the  diet are encouraged to eat locally and seasonally, as foods consumed in this fashion tend to be more nutrient dense. The Mediterranean Diet is also high in fibrous foods, which can help to bulk stool and relieve constipation.    Hydration for Constipation  Water intake is essential to avoid constipation. Dehydration causes water to be pulled from the intestines and into the circulation, leaving hard, dry stools. Additionally, water is needed to properly absorb micronutrients, which can, in turn, affect constipation.      Supplements for Constipation  Digestive enzymes are naturally produced by the stomach, liver, and pancreas and function to break down foods. If markers of digestion are low, digestive enzyme supplementation may be necessary to aid in the breakdown and absorption of food.    Intestinal permeability  L-glutamine is an amino acid and the primary fuel source for small intestine cells. Glutamine has been shown to strengthen the tight junctions that hold the intestinal cells together, lowering intestinal permeability.         NUTRITION RESOURCES:  IFM Core Packet - IFM Mediterranean diet   Functional Nutrition Fundamentals   Comprehensive Guide  Meal plan with recipes   [email or web address]  [email or web address]  [email or web address]/[email or web address]  [email or web address]

## 2024-03-18 DIAGNOSIS — K59.09 CHRONIC CONSTIPATION: Primary | ICD-10-CM

## 2024-05-15 ENCOUNTER — VIRTUAL VISIT (OUTPATIENT)
Dept: NUTRITION | Facility: CLINIC | Age: 19
End: 2024-05-15
Payer: COMMERCIAL

## 2024-05-15 DIAGNOSIS — K59.09 CHRONIC CONSTIPATION: Primary | ICD-10-CM

## 2024-05-15 PROCEDURE — 97803 MED NUTRITION INDIV SUBSEQ: CPT | Mod: 95 | Performed by: DIETITIAN, REGISTERED

## 2024-05-15 NOTE — PROGRESS NOTES
Medical Nutrition Therapy  Visit Type: Reassessment and intervention    Visit Details    How would you like to obtain your AVS? MyChart  If the correspondence for visit is dropped, how would you like your dietitian to reconnect with you:   call back by phone? Yes    Text to cell phone: [phone]  Will anyone else be joining your video visit or telephone call? No  {If patient encounters technical issues they should call [phone] :86    Type of service:  Video Visit     Start Time: 10:04 AM    End Time:10:34 AM    Originating Location (pt. Location): Home    Distant Location (provider location):  [facility] Nutrition Department -remote/offsite    Platform used for Video Visit: William      Referring Provider: Ishmael Ceballos  [facility]     REASON FOR REFERRAL:   Grecia Lombardo is a 19 year old female who is interested in Medical Nutrition Therapy (MNT) and education related to weight management and constipation.     She is accompanied by self.     Changes since previous consult Yes        Behavior Status:Improvement shown  Barriers Include: creating consistent eating habits and getting in adequate micronutrients such as iron and variety of fiber     Goals: What would you like to work on that will help you most over the next several weeks? Meal planning especially dinner and increasing healthy fats      Constipation and eating habits better now that she has gotten home and been away from college and starting to make the changes dicussed at initial. The resources from our last consult have been really helpful. She has been using the [employer] ayaz to help meal planning has been good. She would like to look at getting some labs on. She has noticed since stopping iron supplement she gets bruise that lasts a long time. She used to bruise before taking the iron supplement and in last month covered in bruse that dont go away quickly. Has protein powder at home and has started  implementing with breakfast. She has been trying to increase more protein from chicken. She feels more confident using it in meals. She has been alternating breakfast throughout the week between fruit smithies and egg/spinach tomato based muffins. Now being home her parents will make dinner and has been focusing on lunch. She has had more energy and more soft formed Bms. GI dr ordered labs but still need to get them taken to see if deficient. She has been increasing her fiber but unsure how much she is actually getting. She does know how to read the label.  Discussed different types of fiber and how much she should get in her day at least 25g and she has been getting apples, oatmeal, nuts daily. Discussed some foods that are high in iron to prevent deficiencies. Discussed switching to different iron supplement that doesn't cause constipation and adds vitamin C for better absorption.       Notes from 3/15/24  Switched meds 4-5x over thel ast year and hasn't seen improvement with digestion and wants more natural ways to treat her issues. She was bleeding for 7 months monthly in 8th grade her period didn't stop and started supplement to avoid blood transfusion. Not having heavy flows now. In highschool would vape and stopped in college and was eating more dorm food and gaining weight. Noticed now that as she got her own apartment started losing weight and wants to feel more healthy no weight goal.      Takes brand one a day multi. Target generic brand taking vitron-c iron 65mg daily since 13, nature made omega 3, magnesium 400mg and biotin natural. Not strictly lacto-ovo vegan primarily but once every 6 months or so will have poultry. Sometimes ground beef or turkey in tacos every 4 months or so.     https://www.[employer].WePlann/vitamins/vitamins-for-women/vitacraves-gummy-womens-multivitamin    https://www.hoohbe.com/fxwezi-c-ockbxn-ingredients-and-other-product-information#ingredients    Discussed discontinuing iron,  magnesium and biotin     Breakfast; marni seed pudding with fruit or eggs with cottage cheese     Hummus with carrots lunch or protein yogurt for marni seed pudding or string cheese     Dinner: tofu and beans or quinoa salad with cucumbers or tomatoes, peppers beans and tofu   NUTRITION ASSESSMENT:       3/15/2024    12:10 PM   Nutritional Goals   Nutritional Goal Create a plan to lose weight;Manage Digestive Issues;Increase energy;What to eat for my specific health concerns             No data to display                Failed to redirect to the Timeline version of the [employer] SmartLink.        No data to display                   3/15/2024    12:10 PM   Gastrointestinal   Constipation Current   Nausea or Vomiting Current   Diarrhea Current   Gas/bloating Current            No data to display                    No data to display                    No data to display                    No data to display                    No data to display                   3/15/2024    12:10 PM   Psychological   Depression/Anxiety Past           No data to display                   3/15/2024    12:10 PM   Womens Health Assessment   Hysterectomy No   I am pregnant.  No   I am breastfeeding. No   I want to become pregnant within the next year . No   What do you use for contraception?  condoms;birth control pill   Any problems with current birth control method?   No   Date of last menstrual period 3/10/2024   Are your periods irregular? No   Days from the start of one period to the next. 28   Days of Menstral Flow 4   Associated with menstral periods. Heavy bleeding;Water retention;Diarrhea/constipation   Are you officially in menopause? (no period for one year or longer)  No        Past Medical History:  Past Medical History:   Diagnosis Date    Menorrhagia with irregular cycle     Improved with OCP.  Evaluation by Dr. Benjamin at [facility] at age 13.     Visual impairment     Drusen on opthalmology exam, normal opthalmologic  ultrasound       Previous Surgeries:   Past Surgical History:   Procedure Laterality Date    TONSILLECTOMY  2021        Family History:  Family History   Problem Relation Age of Onset    No Known Problems Mother     No Known Problems Father         Lifestyle History:      3/15/2024    12:10 PM   Lifestyle   Do you feel your life is stressful right now?  Yes   What is the cause(s) of stress in your life?  Multi-tasking and multiple deadlines to meet   Are you currently implementing any strategies to help manage stress? Yes   What are you doing to manage stress?  Breathing exercises;Movement and exercise;Counseling;Take time for self        Exercise History:      3/15/2024    12:10 PM   Exercise   Does your occupation require extended periods of sitting?  No   Does your occupation require extended periods of repetitive movements (ex: walking or lifting)?  No   Do you currently participate in any forms of exercise? Yes   Check all the exercises you participate in: Walking;Yoga;Weight Lifting   How many times per week do you exercise? 5 times   How long do you usually exercise? 60 min        Sleep History:      3/15/2024    12:10 PM   Sleep   How many hours (on average) do you sleep per night? 4-6   What time do you turn off the lights? 10 PM   How long does it take for you to fall asleep? 1 hour   What time do you stop using electronic devices? 10 PM   What time do you wake up? 7 AM   When do you eat your first meal?  8 AM   Do you feel well-rested during the day?  No   Do you take naps?  No   Do you have a comfortable bedroom environment (cool, quiet, dark, etc)? Yes   Do you have a sleep routine/ ritual that you do before bed?  Yes   How many hours do you spend per day looking at a screen (TV, computer, tablet and phone)? 5 to 6   Select all factors that apply to your current sleep habits: Difficulty falling asleep;Have difficulty waking up in the morning;Wake up in the middle of the night;Feel  tired/sluggish/fatigued during the day;Have tried supplements (ie: melatonin) for sleep;Leg twitching at night        Nutrition History:      3/15/2024    12:10 PM   Nutrition   Have you ever had a nutrition consultation? No   Do you currently follow a special diet or nutritional program? No   What do you feel are the biggest barriers getting in the way of achieving you nutritional goals? Lack of nutrition knowledge;Stress;Transportation issues   Do you have any food allergies, sensitivities or intolerances?  No           3/15/2024    12:10 PM   Digestion   Do you experience stomach pains/cramping? 2-3 times per week   Do you experience bloating?  Daily   Do you experience gas?  Daily   Do you experience heartburn/acid reflux/indigestion? Never   How often do you have a bowel movement? Once per week or less   What is a typical bowel movement like for you? Select all that apply: Hard to pass;Varies a lot;Liquid;Alternates between loose and hard          3/15/2024    12:10 PM   Food Access:    Who does the grocery shopping? Self   How often is grocery shopping done? Every 2 weeks   Where do you usually receive your groceries from? Select all that apply: Target; [employer]   Do you read food labels? No   Who does the cooking? Select all that apply: Self   How many meals do you eat out per week?  0 to 1   What restaurants do you typically choose? [employer] (Chipotle, Panera, etc.)          3/15/2024    12:10 PM   Daily Patterns:   How many days per week do you have breakfast? 7   How many days per week do you have lunch? 5   How many days per week do you have dinner? 7   How many days per week do you have snacks? 6          3/15/2024    12:10 PM   Protein Intake:   How many times per day do you typically consume a protein source(s)? 1   What types of protein do you currently eat?  Eggs;Beans;Tofu           3/15/2024    12:10 PM   Fat Intake:    How many times per day do you typically consume healthy fat(s)? 1   What  types of health fats do you currently eat? Select all that apply:  Almonds;Cashews;Oscar seeds;Flax seeds;Peanut butter;Olive oil           3/15/2024    12:10 PM   Fruit Intake:    How many times per day do you typically consume fruits? 1   What types of fruit do currently eat? Apple;Banana;Blueberries;Cherries;Grapes;Kiwi;Mandarin oranges;Raspberries;Tangerines;Watermelon           3/15/2024    12:10 PM   Vegetable Intake:    How many times per day do you typically consume vegetables? 2   What types of vegetables do you currently eat? Beans;Broccoli;Cabbage;Carrots;Cauliflower;Celery;Corn;Cucumber;Greens (jaelyn, kale etc);Onions;Potato (baked, boiled, mashed, French fries);Sugar snap peas;Tomato          3/15/2024    12:10 PM   Grain Intake:    How many times per day do you typically consume grains? 1   What types of grains do currently eat? Select all that apply:  Quinoa (gluten free);White rice (gluten free);Bagel (non-gluten free);Breads (non-gluten free);Cereals (non-gluten free);Chips (non-gluten free);Crackers (non-gluten free);Muffins (non-gluten free);Pancakes/waffles (non-gluten free);Pasta (non-gluten free)           3/15/2024    12:10 PM   Dairy Intake:    How many times per day do you typically consume dairy? 1   What types of dairy do currently eat? Select all that apply:  Milk;Cheese;Yogurt           3/15/2024    12:10 PM   Non-Dairy Alternative Intake:    How many times per day do you typically consume non-dairy alternatives? 0   What types of non-dairy alternatives do currently eat? Select all that apply:  [place] milk;Oat milk           3/15/2024    12:10 PM   Sweets Intake:    How many times per day do you typically consume sweets? 1          3/15/2024    12:10 PM   Beverage Intake:    How many 8 oz cups of water do you typically consume per day?  8 or more   How many 8 oz cups of caffeine do you typically consume per day?  1 to 2   How many drinks of alcohol do you typically consume per week (1  drink = 5 oz wine, 12 oz beer, 1.5 oz spirits)?   4 to 7           3/15/2024    12:10 PM   Lifestyle Recall:    What time did you wake up? 7 AM   What time did you go to sleep? 11 PM   What time did you have breakfast? 7-8 AM   Where did you have breakfast?  Home   What time did you have a morning snack? No snack   What time did you have lunch? 12-1 PM   Where did you have lunch?  School   What time did you have an afternoon snack? 4-5 PM   Where did you have your afternoon snack? Home   What time did you have dinner? 6-7 PM   Where did you have dinner?  Home   What time did you have an evening snack? 8-9 PM   Where did you have your evening snack? Home   What time of day did you exercise? 7 AM   Where did you exercise? Home          Additional concerns:    MEDICATIONS:  Current Outpatient Medications   Medication Sig Dispense Refill    biotin 1000 MCG TABS tablet       clindamycin (CLEOCIN T) 1 % external lotion APPLY TO FACE IN THE MORNING TOPICALLY      fish oil-omega-3 fatty acids 1000 MG capsule       ibuprofen (ADVIL/MOTRIN) 600 MG tablet Take 600 mg by mouth every 6 hours as needed      iron,carbonyl-vitamin C (VITRON-C) 65 mg iron- 125 mg TbEC [IRON,CARBONYL-VITAMIN C (VITRON-C) 65 MG IRON- 125 MG TBEC] Take by mouth.      multivitamin (MULTIPLE VITAMIN ORAL) [MULTIVITAMIN (MULTIPLE VITAMIN ORAL)]       ondansetron (ZOFRAN ODT) 4 MG ODT tab Take 1 tablet (4 mg) by mouth every 6 hours as needed for nausea or vomiting 12 tablet 0    SPRINTEC 28 0.25-35 MG-MCG tablet Take 1 tablet by mouth daily 84 tablet 3    tretinoin (RETIN-A) 0.025 % external cream 1 APPLICATION AT BEDTIME TOPICALLY TO FACE             3/15/2024    12:10 PM   Dietary Supplements   Individual Vitamin Supplements C;General multivitamin   Individual Mineral Supplements Iron;Magnesium         ALLERGIES:   No Known Allergies     .na  LABS:  Last Basic Metabolic Panel:  Lab Results   Component Value Date     02/10/2022     09/24/2021  "    08/26/2021      Lab Results   Component Value Date    POTASSIUM 3.4 02/10/2022    POTASSIUM 4.3 09/24/2021    POTASSIUM 4.0 08/26/2021     Lab Results   Component Value Date    CHLORIDE 102 02/10/2022    CHLORIDE 104 09/24/2021    CHLORIDE 107 08/26/2021     Lab Results   Component Value Date    WILNER 9.2 02/10/2022    WILNER 10.3 09/24/2021    WILNER 9.5 08/26/2021     Lab Results   Component Value Date    CO2 23 02/10/2022    CO2 25 09/24/2021    CO2 21 08/26/2021     Lab Results   Component Value Date    BUN 10 02/10/2022    BUN 7 09/24/2021    BUN 7 08/26/2021     Lab Results   Component Value Date    CR 0.68 02/10/2022    CR 0.63 09/24/2021    CR 0.67 08/26/2021     Lab Results   Component Value Date     02/10/2022    GLC 83 09/24/2021    GLC 65 08/26/2021       Last Glucose Profile:   No results found for: \"A1C\"    Last Lipid Profile:   Cholesterol   Date Value Ref Range Status   07/30/2020 180 (H) <=169 mg/dL Final     Direct Measure HDL   Date Value Ref Range Status   07/30/2020 78 >45 mg/dL Final     LDL Cholesterol Calculated   Date Value Ref Range Status   07/30/2020 90 <=109 mg/dL Final     Triglycerides   Date Value Ref Range Status   07/30/2020 61 <=89 mg/dL Final     No results found for: \"CHOLHDLRATIO\"    Most recent CBC:  Recent Labs   Lab Test 02/10/22  2055 01/27/22  1532 08/26/21  1019   WBC 15.2* 7.8 5.3   HGB 12.6 12.2 12.7   HCT 39.2 37.5 38.5    289 290     Most recent hepatic panel:  Recent Labs   Lab Test 08/26/21  1019   ALT 18   AST 20     Most recent creatinine:  Recent Labs   Lab Test 02/10/22  2055 09/24/21  0820   CR 0.68 0.63       No components found for: \"GFRESETIMATED\"LASTLAB(gfrestblack:1@  Lab Results   Component Value Date    ALBUMIN 3.8 08/26/2021       Last Thyroid Profile:   TSH   Date Value Ref Range Status   03/15/2023 1.41 0.50 - 4.30 uIU/mL Final       Last Mineral Profile:   Ferritin   Date Value Ref Range Status   08/26/2021 27 6 - 40 ng/mL Final " "    Iron   Date Value Ref Range Status   06/05/2019 66 42 - 175 ug/dL Final       Autoimmune & Inflammatory   No results found for: \"CRP\"      Last Vitamin Profile:   No results found for: \"TQM246\", \"RCOO193\", \"FDVA83KJWNF\", \"VITD3\", \"D2VIT\", \"D3VIT\", \"DTOT\", \"UM37788254\", \"JA16112321\", \"GT28261598\", \"PL09176452\", \"[id number]\", \"[id number]\"    ANTHROPOMETRICS:  Vitals:   BP Readings from Last 1 Encounters:   03/14/24 121/78     Pulse Readings from Last 1 Encounters:   03/14/24 109     Estimated body mass index is 26.64 kg/m  as calculated from the following:    Height as of 3/14/24: 1.727 m (5' 8\").    Weight as of 3/14/24: 79.5 kg (175 lb 3.2 oz).    Wt Readings from Last 5 Encounters:   03/14/24 79.5 kg (175 lb 3.2 oz)   03/13/23 63.5 kg (140 lb) (71%, Z= 0.56)*   02/10/22 63.5 kg (140 lb) (75%, Z= 0.68)*   01/27/22 65.8 kg (145 lb) (80%, Z= 0.85)*   12/10/21 63 kg (138 lb 14.4 oz) (74%, Z= 0.65)*     * Growth percentiles are based on CDC (Girls, 2-20 Years) data.       NUTRITION DIAGNOSIS:   1. Micronutrient imbalances related to following lacto-ovo vegetarian diet and supplementing with possible un bioavailable nutrients (such as Vitamin C (as asorbic acid) 125 mg 140%  Elemental Iron 65mg (as carbonyl iron) as evidenced by constipation but normal iron levels (previous history of low iron in past).       NUTRITION INTERVENTION:   Nutrition Education (Application):  Core Food Plan Materials by [employer]     Discussed healthy fibers    Reviewed current diet and supplement brand intake for excess intake and not enough intake   Micronutrients seem to impact the flow of water into and out of the GI tract, which can affect the consistency of stool. Micronutrients may also affect the rate of movement, or motility,    throughout the GI tract. Each specific micronutrient plays an important role in healthy digestion, so imbalances in any of them will affect the GI tract in a particular way, " potentially leading to constipation.    Reviewed and planned out some recipes for breakfast and dinner      Discussed meal planning ayaz and foods on budget grocery shopping    Long Term Goals:   Goal: [place] Stool type 4 - formed and soft and decrease constipation   Goal: Maintain a healthy balanced diet - increase protein and address potential nutrient deficiencies following lacto-ovo vegetarian diet.         Short Term Goals:  Goal 1: Improvement Shown: Focus on having a healthy balanced breakfast daily with protein. Try the hemp protein powder for extra protein and fiber.   https://[employer]/collections/hemp-yeah-protein-powder/products/hemp-yeah-max-fiber-unsweetened    Incorporate a whole grain at breakfast ex oatmeal naturally gluten free with more healthy fats such as handful of walnuts and 1 fruit serving 15g of carbs such as blueberries. Try a lean protein on side such as 2 eggs, 2 turkey or chicken sausage.    Try a smoothie 1-2x a week for breakfast for the next six weeks see recipe discussed. add in unsweetened flax, hemp or coconut milk, 1 tbsp of healthy fat such as flax seed ground or marni seed ground. serving of fruit 1 cup of berries. Veggie (optional), 1 scoop of plant based protein powder or collagen powder, 1 serving of fruit and veggie such as kale, spinach, or veggie powder even spirulina     Avocado on slice of gluten free bread recommend base [employer] brand      https://[employer].Immunity Project/?gclid=[email or web address]_wcB     Birch rojo brand for pancakes - paleo  https://IS[employer]X/collections/pancake-waffle-mix/products/paleo     Try siete soft taco shells with eggs and veggies in am for breakfast   https://[employer]/collections/tortillas/products/rnkkgx-lwwid-xcxyhgvcs-6-pack     Goal 2: Improvement Shown: Check out the meal planning ayaz - Try to plan two-three new dinners from [employer] and make 4  servings so 2 can be leftovers for lunch.     Goal 3: Choose foods high in fiber: Aim for at least 5 grams of fiber per serving of food or a total of 25-35 grams fiber per day. Remember, when looking at the label, you can take the fiber away from the total carbs. Ex:15g of total carbs - 4g of fiber = 11g net carbs     Insoluble fiber acts like a bulky  inner broom,  sweeping out debris from the intestine and creating more motility and movement.      Soluble fiber attracts water and swells, creating a gel that slows digestion.  Also, slows the release of glucose from foods into the blood which stops spikes in blood sugar levels.  Soluble fiber traps toxins in the gut, helping to carry them to excretion and provides healthy bacteria in the digestive tract.      Goal 4: Improvement Shown: Recommend stopping supplements magnesium, iron, omega and get labs tested.   Start the following supplements to see if that helps with constipation.   One Multivitamin by Pure encapsulations - 1 capsule per day with food   [place] Naturals ultimate omega 3 -2 capsules daily with food   Start natural vitality calm magnesium - Add chamomile tea with magnesium at night before bed. Star with 1 tsp and increase as needed. Add another tsp in am to your smoothie before going to school.   Pure Encapsulations Iron-C - 15 mg Iron - 175 mg Vitamin C - Red Blood Cell Support* - Energy Production - Highly Bioavailable Iron Pill - Gluten Free & Vegan - 60 Capsules - start with 1 capsule per day     Goal 5: Focus on some plant based foods high in iron and adding in some animal based as tolerated.     Don't fret if you are vegan, plant-forward, or fully plant-based, there are lots of plant-based foods that are excellent sources of iron. Consuming a variety of plants will help to ensure adequate iron intake. In summary, practice these things to maximize iron absorption:     Space out iron-rich foods throughout the day to help increase absorption.   Eat  non-heme iron foods with vitamin C to increase absorption by five times!   Eat non-heme iron foods alongside foods high in beta carotene to increase iron absorption three-fold.   Cook non-heme iron foods with allium vegetables (onions, garlic, and shallots) to increase iron absorption seven-fold.  Avoid drinking coffee, tea or red wine when consuming high iron meals as they may inhibit iron absorption. Try to consume coffee and tea two hours before or after eating meals rich in iron.  Cook with an iron skillet to increase absorption.   Take calcium supplements separately and avoid eating calcium-rich foods when eating an iron-rich meal.  Try soaking or fermentation. Phytates in some iron-rich plant-based foods decrease the bioavailability but soaking can help to increase absorption and decrease phytates.      Great plant-based sources of iron include:  Weet-Bix  - 10mg of iron/100g  Pumpkin seeds / pepitas - 10mg/100g  Sundried tomatoes - 5.6mg/100g  Sesame seeds / tahini - 5.2mg/100g  Cashews - 5mg/100g  Mixed-grain bread roll - 4.7mg/100g  English spinach (raw) - 3.5mg/100g  Dried apricots - 3.1mg/100g  Tofu (firm) - 2.9mg/100g  Dates (dried) - 2.6mg/100g  Lentils / soybeans / kidney beans - 1.8mg-2.2mg/100g  Amaranth (cooked) - 2.1mg/100g  Tofu (silken / soft) - 1.8mg/100g  Figs (dried) - 1.4mg/100g  Baked beans - 1mg/100g    Labs recommended:   Vitamin B1, vitamin B6, vitamin B12, vitamin D, calcium, iodine, iron, ferritin, and zinc in your blood to help detect any deficiencies common to a vegetarian diet.      How Do Micronutrient Imbalances Cause Constipation?  Micronutrients seem to impact the flow of water into and out of the GI tract, which can affect the consistency of stool. Micronutrients may also affect the rate of movement, or motility, throughout the GI tract. Each specific micronutrient plays an important role in healthy digestion, so imbalances in any of them will affect the GI tract in a particular  way, potentially leading to constipation.    Which Micronutrient Imbalances Are Associated With Constipation?  Micronutrients can be found in both low and high levels, and we see both excess and deficiencies of certain micronutrients leading to constipation.    Micronutrient Excess That Can Cause Constipation.  Micronutrient excess can cause constipation through various mechanisms such as reducing gut motility, altering the water balance in the intestine, and interfering with the absorption of other essential nutrients. Here are some examples of how specific micronutrients in excess can cause constipation:    Iron Excess  Iron is a mineral required for proper development and growth. Iron is essential for the creation of hemoglobin, the part of the red blood cell that carries oxygen around the body. Iron deficiencies can be common, especially in pregnant women, and supplementation of iron is often required. One of the most common side effects of iron supplementation is constipation, as it is thought that iron pulls water away from the GI tract, causing hard stools. Iron can come in many forms, with ferrous/ferric sulfate, ferrous/ferric gluconate, and ferrous/ferric fumarate being the most commonly found in supplements. However, other types of iron are available, including ferrous glycine sulfate (ferrous glycinate or ferrous bisglycinate), iron protein succinylate, and ferric citrate, and might be a better option as they are gentler on the GI tract and are not as constipating as the former types of iron mentioned.    Calcium Excess  Calcium is the most common mineral found in the body. It is essential for strong bones, muscular contractions, nerve signaling, hormone release, and blood flow. However, calcium's ability to contract muscles can act on the muscles of the GI tract and slow the transit of stool, leading to constipation. Like iron, different types of calcium may affect the GI differently. Calcium in the  form of calcium carbonate has been labeled as the most constipating type of calcium.    Vitamin D Excess  It is estimated that 40% of Americans are deficient in vitamin D. Vitamin D deficiency can lead to bone pain, impaired immune function, mood swings, low energy levels, and more. Because vitamin D is primarily made from the sun, vitamin D supplementation in northern regions is quite common. In cases of over-supplementation, excessive vitamin D can lead to hypercalcemia or too much calcium in the blood. As discussed above, elevated calcium can cause constipation, and thus elevated vitamin D can also lead to constipation.    Zinc Excess  Excess zinc intake can cause constipation by interfering with the absorption of other essential nutrients, such as copper and iron, which are important for maintaining bowel regularity. Sources of zinc include oysters, red meat, poultry, beans, nuts, and fortified cereals.    Micronutrient Deficiencies That Can Cause Constipation  Micronutrient deficiencies can cause constipation by affecting the proper functioning of the digestive system. Here are some examples of how specific micronutrient deficiencies can cause constipation:    Vitamin D Deficiency  While excess vitamin D can cause constipation, so can low vitamin D. Vitamin D may aid in gastric motility or movement of the muscles in the GI tract. Deficiencies, thus, may lead to the slowing of the movement in the GI, causing constipation.    Thiamine/B1 Deficiency  Thiamine, or vitamin B1, is essential for cellular growth and development. This vitamin is found in whole grains, nuts, seeds, pork, and legumes. Thiamine deficiencies are considered rare in the [place] but occasionally do occur. In the presence of a B1 deficiency, cells in the pancreas will greatly reduce the amount of digestive enzymes they release, which can slow down the digestive tract and lead to constipation.    Potassium Deficiency  Potassium is a mineral used by  every cell in the body, as it plays an important role in cellular fluidity and chemical signaling. A potassium deficiency can cause motility in the GI tract to slow, and in severe cases, it can cause it to stop completely.    Magnesium Deficiency  Magnesium is utilized in over 300 reactions in the body, making it an abundant mineral. Magnesium is widely used in the treatment of constipation, but a study on over 3,000 [place] women showed an association between low magnesium status and a higher prevalence of constipation. Magnesium helps to pull water into the stool, softening it while also stimulating contractions in the GI tract, causing movement of the stool.    Vitamin C Deficiency  Vitamin C deficiency can cause constipation by reducing the production of collagen, which is necessary for the proper functioning of the intestinal lining. Vitamin C also acts as a natural laxative by increasing the water content in the stool. Sources of vitamin C include citrus fruits, berries, kiwi, peppers, and broccoli.      Nutrition for Constipation  The Mediterranean Diet can be a great way to eat to avoid micronutrient deficiencies and relieve constipation. The Mediterranean Diet consists of whole foods, unprocessed grains, and healthy fats. It typically includes foods that contain an array of micronutrients, including fresh fruits and vegetables, legumes, herbs, nuts, seeds, olive oil, and fatty fish. Followers of the diet are encouraged to eat locally and seasonally, as foods consumed in this fashion tend to be more nutrient dense. The Mediterranean Diet is also high in fibrous foods, which can help to bulk stool and relieve constipation.    Hydration for Constipation  Water intake is essential to avoid constipation. Dehydration causes water to be pulled from the intestines and into the circulation, leaving hard, dry stools. Additionally, water is needed to properly absorb micronutrients, which can, in turn, affect  constipation.      Supplements for Constipation  Digestive enzymes are naturally produced by the stomach, liver, and pancreas and function to break down foods. If markers of digestion are low, digestive enzyme supplementation may be necessary to aid in the breakdown and absorption of food.    Intestinal permeability  L-glutamine is an amino acid and the primary fuel source for small intestine cells. Glutamine has been shown to strengthen the tight junctions that hold the intestinal cells together, lowering intestinal permeability.         NUTRITION RESOURCES:  IFM Core Packet - IFM Mediterranean diet   Functional Nutrition Fundamentals   Comprehensive Guide  Meal plan with recipes   [email or web address].[place].i.am.plus electronics/  https://[employer].i.am.plus electronics/  https://www.[employer].i.am.plus electronics/plans  https://www.daily[employer].i.am.plus electronics/          PATIENT'S BEHAVIOR CHANGE GOALS:   See nutrition intervention for patient stated behavior change goals. AVS and nutrition handouts were sent to patient by "[employer].     MONITOR / EVALUATE:  Registered Dietitian will monitor/evaluate the following:   Beliefs and attitudes related to food  Food and nutrition knowledge / skills  Food / Beverage / Nutrient intake   Pertinent Labs  Progress toward meeting stated nutrition-related goals  Readiness to change nutrition-related behaviors  Weight change  Digestion     COORDINATION OF CARE:  Follow up with gastroenterology      FOLLOW-UP:  Follow-up appointment scheduled on July 16th 11am video visit     Time spent in minutes: 30 minutes 2 units   Encounter: Individual    Brittni Lassiter RD, CLT, LD  Integrative Registered Dietitian

## 2024-05-15 NOTE — PATIENT INSTRUCTIONS
NUTRITION INTERVENTION:   Nutrition Education (Application):  Core Food Plan Materials by [employer] [place] [employer]     Discussed healthy fibers    Reviewed current diet and supplement brand intake for excess intake and not enough intake   Micronutrients seem to impact the flow of water into and out of the GI tract, which can affect the consistency of stool. Micronutrients may also affect the rate of movement, or motility,    throughout the GI tract. Each specific micronutrient plays an important role in healthy digestion, so imbalances in any of them will affect the GI tract in a particular way, potentially leading to constipation.    Reviewed and planned out some recipes for breakfast and dinner      Discussed meal planning ayaz and foods on budget grocery shopping    Long Term Goals:   Goal: [place] Stool type 4 - formed and soft and decrease constipation   Goal: Maintain a healthy balanced diet - increase protein and address potential nutrient deficiencies following lacto-ovo vegetarian diet.         Short Term Goals:  Goal 1: Improvement Shown: Focus on having a healthy balanced breakfast daily with protein. Try the hemp protein powder for extra protein and fiber.   https://[employer]!/collections/hemp-yeah-protein-powder/products/hemp-yeah-max-fiber-unsweetened    Incorporate a whole grain at breakfast ex oatmeal naturally gluten free with more healthy fats such as handful of walnuts and 1 fruit serving 15g of carbs such as blueberries. Try a lean protein on side such as 2 eggs, 2 turkey or chicken sausage.    Try a smoothie 1-2x a week for breakfast for the next six weeks see recipe discussed. add in unsweetened flax, hemp or coconut milk, 1 tbsp of healthy fat such as flax seed ground or marni seed ground. serving of fruit 1 cup of berries. Veggie (optional), 1 scoop of plant based protein powder or collagen powder, 1 serving of fruit and veggie such as kale, spinach, or veggie powder even  spirulina     Avocado on slice of gluten free bread recommend base culture brand      https://[employer]/?[email or web address]     Birch rojo brand for pancakes - paleo  https://[employer]/collections/pancake-waffle-mix/products/paleo     Try siete soft taco shells with eggs and veggies in am for breakfast   https://[employer]pa/[employer]endorStack/tortillas/products/wzoewp-eyvoz-pbcjeigzz-6-pack     Goal 2: Improvement Shown: Check out the meal planning ayaz - Try to plan two-three new dinners from mealime.com and make 4 servings so 2 can be leftovers for lunch.     Goal 3: Choose foods high in fiber: Aim for at least 5 grams of fiber per serving of food or a total of 25-35 grams fiber per day. Remember, when looking at the label, you can take the fiber away from the total carbs. Ex:15g of total carbs - 4g of fiber = 11g net carbs     Insoluble fiber acts like a bulky  inner broom,  sweeping out debris from the intestine and creating more motility and movement.      Soluble fiber attracts water and swells, creating a gel that slows digestion.  Also, slows the release of glucose from foods into the blood which stops spikes in blood sugar levels.  Soluble fiber traps toxins in the gut, helping to carry them to excretion and provides healthy bacteria in the digestive tract.      Goal 4: Improvement Shown: Recommend stopping supplements magnesium, iron, omega and get labs tested.   Start the following supplements to see if that helps with constipation.   One Multivitamin by Pure encapsulations - 1 capsule per day with food   [place] Naturals ultimate omega 3 -2 capsules daily with food   Start natural vitality calm magnesium - Add chamomile tea with magnesium at night before bed. Star with 1 tsp and increase as needed. Add another tsp in am to your smoothie before going to school.   Pure Encapsulations Iron-C - 15 mg Iron - 175 mg  Vitamin C - Red Blood Cell Support* - Energy Production - Highly Bioavailable Iron Pill - Gluten Free & Vegan - 60 Capsules - start with 1 capsule per day     Goal 5: Focus on some plant based foods high in iron and adding in some animal based as tolerated.     Don't fret if you are vegan, plant-forward, or fully plant-based, there are lots of plant-based foods that are excellent sources of iron. Consuming a variety of plants will help to ensure adequate iron intake. In summary, practice these things to maximize iron absorption:     Space out iron-rich foods throughout the day to help increase absorption.   Eat non-heme iron foods with vitamin C to increase absorption by five times!   Eat non-heme iron foods alongside foods high in beta carotene to increase iron absorption three-fold.   Cook non-heme iron foods with allium vegetables (onions, garlic, and shallots) to increase iron absorption seven-fold.  Avoid drinking coffee, tea or red wine when consuming high iron meals as they may inhibit iron absorption. Try to consume coffee and tea two hours before or after eating meals rich in iron.  Cook with an iron skillet to increase absorption.   Take calcium supplements separately and avoid eating calcium-rich foods when eating an iron-rich meal.  Try soaking or fermentation. Phytates in some iron-rich plant-based foods decrease the bioavailability but soaking can help to increase absorption and decrease phytates.      Great plant-based sources of iron include:  Weet-Bix  - 10mg of iron/100g  Pumpkin seeds / pepitas - 10mg/100g  Sundried tomatoes - 5.6mg/100g  Sesame seeds / tahini - 5.2mg/100g  Cashews - 5mg/100g  Mixed-grain bread roll - 4.7mg/100g  English spinach (raw) - 3.5mg/100g  Dried apricots - 3.1mg/100g  Tofu (firm) - 2.9mg/100g  Dates (dried) - 2.6mg/100g  Lentils / soybeans / kidney beans - 1.8mg-2.2mg/100g  Amaranth (cooked) - 2.1mg/100g  Tofu (silken / soft) - 1.8mg/100g  Figs (dried) - 1.4mg/100g  Baked  beans - 1mg/100g    Labs recommended:   Vitamin B1, vitamin B6, vitamin B12, vitamin D, calcium, iodine, iron, ferritin, and zinc in your blood to help detect any deficiencies common to a vegetarian diet.      How Do Micronutrient Imbalances Cause Constipation?  Micronutrients seem to impact the flow of water into and out of the GI tract, which can affect the consistency of stool. Micronutrients may also affect the rate of movement, or motility, throughout the GI tract. Each specific micronutrient plays an important role in healthy digestion, so imbalances in any of them will affect the GI tract in a particular way, potentially leading to constipation.    Which Micronutrient Imbalances Are Associated With Constipation?  Micronutrients can be found in both low and high levels, and we see both excess and deficiencies of certain micronutrients leading to constipation.    Micronutrient Excess That Can Cause Constipation.  Micronutrient excess can cause constipation through various mechanisms such as reducing gut motility, altering the water balance in the intestine, and interfering with the absorption of other essential nutrients. Here are some examples of how specific micronutrients in excess can cause constipation:    Iron Excess  Iron is a mineral required for proper development and growth. Iron is essential for the creation of hemoglobin, the part of the red blood cell that carries oxygen around the body. Iron deficiencies can be common, especially in pregnant women, and supplementation of iron is often required. One of the most common side effects of iron supplementation is constipation, as it is thought that iron pulls water away from the GI tract, causing hard stools. Iron can come in many forms, with ferrous/ferric sulfate, ferrous/ferric gluconate, and ferrous/ferric fumarate being the most commonly found in supplements. However, other types of iron are available, including ferrous glycine sulfate (ferrous  glycinate or ferrous bisglycinate), iron protein succinylate, and ferric citrate, and might be a better option as they are gentler on the GI tract and are not as constipating as the former types of iron mentioned.    Calcium Excess  Calcium is the most common mineral found in the body. It is essential for strong bones, muscular contractions, nerve signaling, hormone release, and blood flow. However, calcium's ability to contract muscles can act on the muscles of the GI tract and slow the transit of stool, leading to constipation. Like iron, different types of calcium may affect the GI differently. Calcium in the form of calcium carbonate has been labeled as the most constipating type of calcium.    Vitamin D Excess  It is estimated that 40% of Americans are deficient in vitamin D. Vitamin D deficiency can lead to bone pain, impaired immune function, mood swings, low energy levels, and more. Because vitamin D is primarily made from the sun, vitamin D supplementation in [place] is quite common. In cases of over-supplementation, excessive vitamin D can lead to hypercalcemia or too much calcium in the blood. As discussed above, elevated calcium can cause constipation, and thus elevated vitamin D can also lead to constipation.    Zinc Excess  Excess zinc intake can cause constipation by interfering with the absorption of other essential nutrients, such as copper and iron, which are important for maintaining bowel regularity. Sources of zinc include oysters, red meat, poultry, beans, nuts, and fortified cereals.    Micronutrient Deficiencies That Can Cause Constipation  Micronutrient deficiencies can cause constipation by affecting the proper functioning of the digestive system. Here are some examples of how specific micronutrient deficiencies can cause constipation:    Vitamin D Deficiency  While excess vitamin D can cause constipation, so can low vitamin D. Vitamin D may aid in gastric motility or movement of  the muscles in the GI tract. Deficiencies, thus, may lead to the slowing of the movement in the GI, causing constipation.    Thiamine/B1 Deficiency  Thiamine, or vitamin B1, is essential for cellular growth and development. This vitamin is found in whole grains, nuts, seeds, pork, and legumes. Thiamine deficiencies are considered rare in the [place] but occasionally do occur. In the presence of a B1 deficiency, cells in the pancreas will greatly reduce the amount of digestive enzymes they release, which can slow down the digestive tract and lead to constipation.    Potassium Deficiency  Potassium is a mineral used by every cell in the body, as it plays an important role in cellular fluidity and chemical signaling. A potassium deficiency can cause motility in the GI tract to slow, and in severe cases, it can cause it to stop completely.    Magnesium Deficiency  Magnesium is utilized in over 300 reactions in the body, making it an abundant mineral. Magnesium is widely used in the treatment of constipation, but a study on over 3,000 [place] women showed an association between low magnesium status and a higher prevalence of constipation. Magnesium helps to pull water into the stool, softening it while also stimulating contractions in the GI tract, causing movement of the stool.    Vitamin C Deficiency  Vitamin C deficiency can cause constipation by reducing the production of collagen, which is necessary for the proper functioning of the intestinal lining. Vitamin C also acts as a natural laxative by increasing the water content in the stool. Sources of vitamin C include citrus fruits, berries, kiwi, peppers, and broccoli.      Nutrition for Constipation  The Mediterranean Diet can be a great way to eat to avoid micronutrient deficiencies and relieve constipation. The Mediterranean Diet consists of whole foods, unprocessed grains, and healthy fats. It typically includes foods that contain an array of micronutrients,  including fresh fruits and vegetables, legumes, herbs, nuts, seeds, olive oil, and fatty fish. Followers of the diet are encouraged to eat locally and seasonally, as foods consumed in this fashion tend to be more nutrient dense. The Mediterranean Diet is also high in fibrous foods, which can help to bulk stool and relieve constipation.    Hydration for Constipation  Water intake is essential to avoid constipation. Dehydration causes water to be pulled from the intestines and into the circulation, leaving hard, dry stools. Additionally, water is needed to properly absorb micronutrients, which can, in turn, affect constipation.      Supplements for Constipation  Digestive enzymes are naturally produced by the stomach, liver, and pancreas and function to break down foods. If markers of digestion are low, digestive enzyme supplementation may be necessary to aid in the breakdown and absorption of food.    Intestinal permeability  L-glutamine is an amino acid and the primary fuel source for small intestine cells. Glutamine has been shown to strengthen the tight junctions that hold the intestinal cells together, lowering intestinal permeability.         NUTRITION RESOURCES:  IFM Core Packet - IFM Mediterranean diet   Functional Nutrition Fundamentals   Comprehensive Guide  Meal plan with recipes   [email or web address]  [email or web address]  [email or web address]://[email or web address].com/plans  [email or web address] [email or web address].[email or web address]

## 2024-05-22 ENCOUNTER — LAB (OUTPATIENT)
Dept: LAB | Facility: CLINIC | Age: 19
End: 2024-05-22
Payer: COMMERCIAL

## 2024-05-22 DIAGNOSIS — Z11.4 SCREENING FOR HIV (HUMAN IMMUNODEFICIENCY VIRUS): Primary | ICD-10-CM

## 2024-05-22 DIAGNOSIS — Z11.59 NEED FOR HEPATITIS C SCREENING TEST: ICD-10-CM

## 2024-05-22 DIAGNOSIS — K59.09 CHRONIC CONSTIPATION: ICD-10-CM

## 2024-05-22 DIAGNOSIS — Z11.3 SCREENING FOR STDS (SEXUALLY TRANSMITTED DISEASES): ICD-10-CM

## 2024-05-22 PROCEDURE — 82728 ASSAY OF FERRITIN: CPT

## 2024-05-22 PROCEDURE — 84207 ASSAY OF VITAMIN B-6: CPT | Mod: 90

## 2024-05-22 PROCEDURE — 83540 ASSAY OF IRON: CPT

## 2024-05-22 PROCEDURE — 83789 MASS SPECTROMETRY QUAL/QUAN: CPT | Mod: 90

## 2024-05-22 PROCEDURE — 83550 IRON BINDING TEST: CPT

## 2024-05-22 PROCEDURE — 84425 ASSAY OF VITAMIN B-1: CPT | Mod: 90

## 2024-05-22 PROCEDURE — 36415 COLL VENOUS BLD VENIPUNCTURE: CPT

## 2024-05-22 PROCEDURE — 87389 HIV-1 AG W/HIV-1&-2 AB AG IA: CPT

## 2024-05-22 PROCEDURE — 86803 HEPATITIS C AB TEST: CPT

## 2024-05-22 PROCEDURE — 82306 VITAMIN D 25 HYDROXY: CPT

## 2024-05-22 PROCEDURE — 82607 VITAMIN B-12: CPT

## 2024-05-22 PROCEDURE — 84630 ASSAY OF ZINC: CPT | Mod: 90

## 2024-05-22 PROCEDURE — 99000 SPECIMEN HANDLING OFFICE-LAB: CPT

## 2024-05-22 PROCEDURE — 82310 ASSAY OF CALCIUM: CPT

## 2024-05-22 PROCEDURE — 83735 ASSAY OF MAGNESIUM: CPT

## 2024-05-23 LAB
CALCIUM SERPL-MCNC: 9.5 MG/DL (ref 8.6–10)
FERRITIN SERPL-MCNC: 14 NG/ML (ref 6–175)
HCV AB SERPL QL IA: NONREACTIVE
HIV 1+2 AB+HIV1 P24 AG SERPL QL IA: NONREACTIVE
IRON BINDING CAPACITY (ROCHE): 432 UG/DL (ref 240–430)
IRON SATN MFR SERPL: 21 % (ref 15–46)
IRON SERPL-MCNC: 89 UG/DL (ref 37–145)
MAGNESIUM SERPL-MCNC: 1.9 MG/DL (ref 1.7–2.3)
VIT B12 SERPL-MCNC: 410 PG/ML (ref 232–1245)
VIT D+METAB SERPL-MCNC: 49 NG/ML (ref 20–50)
ZINC SERPL-MCNC: 84.8 UG/DL

## 2024-05-24 LAB — IODINE SERPL-MCNC: 81 NG/ML (ref 40–92)

## 2024-05-25 LAB — PYRIDOXAL PHOS SERPL-SCNC: 155 NMOL/L

## 2024-05-27 LAB — VIT B1 PYROPHOSHATE BLD-SCNC: 134 NMOL/L

## 2024-06-24 ASSESSMENT — ANXIETY QUESTIONNAIRES
7. FEELING AFRAID AS IF SOMETHING AWFUL MIGHT HAPPEN: SEVERAL DAYS
GAD7 TOTAL SCORE: 6
7. FEELING AFRAID AS IF SOMETHING AWFUL MIGHT HAPPEN: SEVERAL DAYS
6. BECOMING EASILY ANNOYED OR IRRITABLE: SEVERAL DAYS
4. TROUBLE RELAXING: SEVERAL DAYS
1. FEELING NERVOUS, ANXIOUS, OR ON EDGE: SEVERAL DAYS
GAD7 TOTAL SCORE: 6
IF YOU CHECKED OFF ANY PROBLEMS ON THIS QUESTIONNAIRE, HOW DIFFICULT HAVE THESE PROBLEMS MADE IT FOR YOU TO DO YOUR WORK, TAKE CARE OF THINGS AT HOME, OR GET ALONG WITH OTHER PEOPLE: SOMEWHAT DIFFICULT
3. WORRYING TOO MUCH ABOUT DIFFERENT THINGS: SEVERAL DAYS
5. BEING SO RESTLESS THAT IT IS HARD TO SIT STILL: SEVERAL DAYS
8. IF YOU CHECKED OFF ANY PROBLEMS, HOW DIFFICULT HAVE THESE MADE IT FOR YOU TO DO YOUR WORK, TAKE CARE OF THINGS AT HOME, OR GET ALONG WITH OTHER PEOPLE?: SOMEWHAT DIFFICULT
GAD7 TOTAL SCORE: 6
2. NOT BEING ABLE TO STOP OR CONTROL WORRYING: NOT AT ALL

## 2024-06-24 ASSESSMENT — PATIENT HEALTH QUESTIONNAIRE - PHQ9
SUM OF ALL RESPONSES TO PHQ QUESTIONS 1-9: 4
SUM OF ALL RESPONSES TO PHQ QUESTIONS 1-9: 4
10. IF YOU CHECKED OFF ANY PROBLEMS, HOW DIFFICULT HAVE THESE PROBLEMS MADE IT FOR YOU TO DO YOUR WORK, TAKE CARE OF THINGS AT HOME, OR GET ALONG WITH OTHER PEOPLE: NOT DIFFICULT AT ALL

## 2024-06-25 ENCOUNTER — OFFICE VISIT (OUTPATIENT)
Dept: INTERNAL MEDICINE | Facility: CLINIC | Age: 19
End: 2024-06-25
Payer: COMMERCIAL

## 2024-06-25 VITALS
SYSTOLIC BLOOD PRESSURE: 114 MMHG | TEMPERATURE: 98 F | HEIGHT: 68 IN | DIASTOLIC BLOOD PRESSURE: 75 MMHG | HEART RATE: 88 BPM | WEIGHT: 172 LBS | OXYGEN SATURATION: 100 % | BODY MASS INDEX: 26.07 KG/M2

## 2024-06-25 DIAGNOSIS — L70.0 ACNE VULGARIS: ICD-10-CM

## 2024-06-25 DIAGNOSIS — T50.905A MEDICATION SIDE EFFECTS, INITIAL ENCOUNTER: Primary | ICD-10-CM

## 2024-06-25 PROCEDURE — 99214 OFFICE O/P EST MOD 30 MIN: CPT | Performed by: INTERNAL MEDICINE

## 2024-06-25 RX ORDER — SPIRONOLACTONE 100 MG/1
1 TABLET, FILM COATED ORAL
COMMUNITY
Start: 2024-06-18 | End: 2024-07-23

## 2024-06-25 NOTE — PROGRESS NOTES
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  Other than the recent spironolactone, the only other prescription medications are written a topical cream, and also oral contraceptive pill    She had started on the spironolactone first week of June, prescribed by her dermatologist.  Initially 1 tablet of 25 mg daily, then after which she increased 2 tablets a day, and then started to get the side effects of lightheadedness and nausea.  I suspect the spironolactone may have dropped her blood pressure, since with no spironolactone her blood pressure is 114/75.    Since stopping the spironolactone, she feels less faint but still nauseated..  She is been keeping herself well-hydrated    On physical exam she has little bit of facial acne.  Thyroid is palpably normal.  Lungs clear, heart regular rhythm, no palpable liver enlargement, no edema.    I told a bit of the high-dose spironolactone almost for sure was the cause of her symptoms.  I told her that spironolactone is both a diuretic, but also interferes with hormonal metabolism, which was actually the effect that the dermatologist was trying to take advantage of.     Today June 25, 2024 lets check a comprehensive metabolic panel which will show her potassium levels, and also kidney function markers.  Since she has a history of iron deficiency, lets also check a CBC.  Also check TSH thyroid cascade.    I told Grecia that I will send her the results via [employer] message.  She may be  pursuing other acne control strategies with her dermatologist.      ---------------------------------------------------------------------------------------------------------------------------  Chief Complaint   Chief Complaint   Patient presents with    Nausea     Light headed. X 1 wwek.  Stopped taking new med Spironolactone due to side effects, last time taken was onm Friday        ---------------------------------------------------------------------------------------------------------------------------  History of Present Illness  This 19 year old old     Reason for visit:  Feeling faint and nauseous  Symptom onset:  3-7 days ago  Symptoms include:  Nausea and extreme light headedness  Symptom intensity:  Severe  Symptom progression:  Staying the same  Had these symptoms before:  Yes  Has tried/received treatment for these symptoms:  Yes  Previous treatment was successful:  Yes  Prior treatment description:  In highschool i would get nauseous and it was related to my anxiety which i was then put on medication for. I stopped taking the medication and have not faint nausea like that sense until maybe now, im unsure if this is anxiety related  What makes it better:  No, danelle tried the breathing techniques i learned in therapy tk help calm my self down feom getting anxious nasuea and those techniques are not workung       Symptoms of lightheadedness and nausea, which she quite reasonably associates with increasing the dose of spironolactone that her dermatologist prescribed her for hormonal acne    Grecia is historically very healthy.  Other than the recent spironolactone, the only other prescription medications are written a topical cream, and also oral contraceptive pill    She had started on the spironolactone first week of June, prescribed by her dermatologist.  Initially 1 tablet of 25 mg daily, then after which she increased 2 tablets a day, and then started to get the side effects of lightheadedness and nausea.  I  suspect the spironolactone may have dropped her blood pressure, since with no spironolactone her blood pressure is 114/75.    Since stopping the spironolactone, she feels less faint but still nauseated..  She is been keeping herself well-hydrated    Wt Readings from Last 3 Encounters:   06/25/24 78 kg (172 lb)   03/14/24 79.5 kg (175 lb 3.2 oz)   03/13/23 63.5 kg (140 lb) (71%, Z= 0.56)*     * Growth percentiles are based on CDC (Girls, 2-20 Years) data.     BP Readings from Last 3 Encounters:   06/25/24 114/75   03/14/24 121/78   02/10/22 108/54       Lab Results   Component Value Date    WBC 15.2 (H) 02/10/2022    HGB 12.6 02/10/2022    HCT 39.2 02/10/2022     02/10/2022    CHOL 180 (H) 07/30/2020    TRIG 61 07/30/2020    HDL 78 07/30/2020    ALT 18 08/26/2021    AST 20 08/26/2021     02/10/2022    BUN 10 02/10/2022    CO2 23 02/10/2022    TSH 1.41 03/15/2023         Review of Systems: A comprehensive review of systems was negative except as noted.  ---------------------------------------------------------------------------------------------------------------------------    Medications and Allergies   Current Outpatient Medications   Medication Sig Dispense Refill    clindamycin (CLEOCIN T) 1 % external lotion APPLY TO FACE IN THE MORNING TOPICALLY      fish oil-omega-3 fatty acids 1000 MG capsule       ibuprofen (ADVIL/MOTRIN) 600 MG tablet Take 600 mg by mouth every 6 hours as needed      iron,carbonyl-vitamin C (VITRON-C) 65 mg iron- 125 mg TbEC [IRON,CARBONYL-VITAMIN C (VITRON-C) 65 MG IRON- 125 MG TBEC] Take by mouth.      multivitamin (MULTIPLE VITAMIN ORAL) [MULTIVITAMIN (MULTIPLE VITAMIN ORAL)]       ondansetron (ZOFRAN ODT) 4 MG ODT tab Take 1 tablet (4 mg) by mouth every 6 hours as needed for nausea or vomiting 12 tablet 0    SPRINTEC 28 0.25-35 MG-MCG tablet Take 1 tablet by mouth daily 84 tablet 3    tretinoin (RETIN-A) 0.025 % external cream 1 APPLICATION AT BEDTIME TOPICALLY TO FACE       "spironolactone (ALDACTONE) 100 MG tablet Take 1 tablet by mouth 2 times daily (Patient not taking: Reported on 6/25/2024)       No Known Allergies     Active Ambulatory Problems     Diagnosis Date Noted    Menorrhagia with irregular cycle 03/14/2024     Resolved Ambulatory Problems     Diagnosis Date Noted    Chronic tonsillitis 01/07/2022     Past Medical History:   Diagnosis Date    Visual impairment      Past Surgical History:   Procedure Laterality Date    TONSILLECTOMY  2021      Past Medical History:   Diagnosis Date    Menorrhagia with irregular cycle     Improved with OCP.  Evaluation by Dr. Benjamin at [facility] at age 13.     Visual impairment     Drusen on opthalmology exam, normal opthalmologic ultrasound        Family and Social History   Family History   Problem Relation Age of Onset    No Known Problems Mother     No Known Problems Father         Social History     Tobacco Use    Smoking status: Never     Passive exposure: Never    Smokeless tobacco: Never   Vaping Use    Vaping status: Never Used   Substance Use Topics    Alcohol use: Yes     Alcohol/week: 4.0 standard drinks of alcohol     Types: 4 Shots of liquor per week    Drug use: Never        Physical Exam   General Appearance:       /75   Pulse 88   Temp 98  F (36.7  C)   Ht 1.727 m (5' 8\")   Wt 78 kg (172 lb)   LMP 06/25/2024 (Approximate)   SpO2 100%   BMI 26.15 kg/m      On physical exam she has little bit of facial acne.  Thyroid is palpably normal.  Lungs clear, heart regular rhythm, no palpable liver enlargement, no edema.     Additional Information        YARELI CLEMENTS MD, MD  Internal Medicine    "

## 2024-06-25 NOTE — PATIENT INSTRUCTIONS
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  Other than the recent spironolactone, the only other prescription medications are written a topical cream, and also oral contraceptive pill    She had started on the spironolactone first week of June, prescribed by her dermatologist.  Initially 1 tablet of 25 mg daily, then after which she increased 2 tablets a day, and then started to get the side effects of lightheadedness and nausea.  I suspect the spironolactone may have dropped her blood pressure, since with no spironolactone her blood pressure is 114/75.    Since stopping the spironolactone, she feels less faint but still nauseated..  She is been keeping herself well-hydrated    On physical exam she has little bit of facial acne.  Thyroid is palpably normal.  Lungs clear, heart regular rhythm, no palpable liver enlargement, no edema.    I told a bit of the high-dose spironolactone almost for sure was the cause of her symptoms.  I told her that spironolactone is both a diuretic, but also interferes with hormonal metabolism, which was actually the effect that the dermatologist was trying to take advantage of.     Today June 25, 2024 lets check a comprehensive metabolic panel which will show her potassium levels, and also kidney function markers.  Since she has a history of iron deficiency, lets also check a CBC.  Also check TSH thyroid cascade.    I told Grecia that I will send her the results via [employer] message.  She may be pursuing other acne control strategies with her dermatologist.     within normal limits

## 2024-06-26 ENCOUNTER — APPOINTMENT (OUTPATIENT)
Dept: LAB | Facility: CLINIC | Age: 19
End: 2024-06-26
Payer: COMMERCIAL

## 2024-06-26 LAB
ALBUMIN SERPL BCG-MCNC: 4.8 G/DL (ref 3.5–5.2)
ALP SERPL-CCNC: 86 U/L (ref 40–150)
ALT SERPL W P-5'-P-CCNC: 22 U/L (ref 0–50)
ANION GAP SERPL CALCULATED.3IONS-SCNC: 10 MMOL/L (ref 7–15)
AST SERPL W P-5'-P-CCNC: 23 U/L (ref 0–45)
BASOPHILS # BLD AUTO: 0 10E3/UL (ref 0–0.2)
BASOPHILS NFR BLD AUTO: 1 %
BILIRUB SERPL-MCNC: 0.4 MG/DL
BUN SERPL-MCNC: 6 MG/DL (ref 6–20)
CALCIUM SERPL-MCNC: 9.8 MG/DL (ref 8.6–10)
CHLORIDE SERPL-SCNC: 101 MMOL/L (ref 98–107)
CREAT SERPL-MCNC: 0.58 MG/DL (ref 0.51–0.95)
DEPRECATED HCO3 PLAS-SCNC: 26 MMOL/L (ref 22–29)
EGFRCR SERPLBLD CKD-EPI 2021: >90 ML/MIN/1.73M2
EOSINOPHIL # BLD AUTO: 0.1 10E3/UL (ref 0–0.7)
EOSINOPHIL NFR BLD AUTO: 3 %
ERYTHROCYTE [DISTWIDTH] IN BLOOD BY AUTOMATED COUNT: 12.9 % (ref 10–15)
GLUCOSE SERPL-MCNC: 76 MG/DL (ref 70–99)
HCT VFR BLD AUTO: 39.9 % (ref 35–47)
HGB BLD-MCNC: 13.1 G/DL (ref 11.7–15.7)
IMM GRANULOCYTES # BLD: 0 10E3/UL
IMM GRANULOCYTES NFR BLD: 0 %
LYMPHOCYTES # BLD AUTO: 2.1 10E3/UL (ref 0.8–5.3)
LYMPHOCYTES NFR BLD AUTO: 41 %
MCH RBC QN AUTO: 30.7 PG (ref 26.5–33)
MCHC RBC AUTO-ENTMCNC: 32.8 G/DL (ref 31.5–36.5)
MCV RBC AUTO: 93 FL (ref 78–100)
MONOCYTES # BLD AUTO: 0.5 10E3/UL (ref 0–1.3)
MONOCYTES NFR BLD AUTO: 9 %
NEUTROPHILS # BLD AUTO: 2.4 10E3/UL (ref 1.6–8.3)
NEUTROPHILS NFR BLD AUTO: 47 %
PLATELET # BLD AUTO: 301 10E3/UL (ref 150–450)
POTASSIUM SERPL-SCNC: 4 MMOL/L (ref 3.4–5.3)
PROT SERPL-MCNC: 7.6 G/DL (ref 6.4–8.3)
RBC # BLD AUTO: 4.27 10E6/UL (ref 3.8–5.2)
SODIUM SERPL-SCNC: 137 MMOL/L (ref 135–145)
TSH SERPL DL<=0.005 MIU/L-ACNC: 1.96 UIU/ML (ref 0.3–4.2)
WBC # BLD AUTO: 5.2 10E3/UL (ref 4–11)

## 2024-06-26 PROCEDURE — 84443 ASSAY THYROID STIM HORMONE: CPT | Performed by: INTERNAL MEDICINE

## 2024-06-26 PROCEDURE — 36415 COLL VENOUS BLD VENIPUNCTURE: CPT | Performed by: INTERNAL MEDICINE

## 2024-06-26 PROCEDURE — 80053 COMPREHEN METABOLIC PANEL: CPT | Performed by: INTERNAL MEDICINE

## 2024-06-26 PROCEDURE — 85025 COMPLETE CBC W/AUTO DIFF WBC: CPT | Performed by: INTERNAL MEDICINE

## 2024-07-22 SDOH — HEALTH STABILITY: PHYSICAL HEALTH: ON AVERAGE, HOW MANY MINUTES DO YOU ENGAGE IN EXERCISE AT THIS LEVEL?: 60 MIN

## 2024-07-22 SDOH — HEALTH STABILITY: PHYSICAL HEALTH: ON AVERAGE, HOW MANY DAYS PER WEEK DO YOU ENGAGE IN MODERATE TO STRENUOUS EXERCISE (LIKE A BRISK WALK)?: 4 DAYS

## 2024-07-22 ASSESSMENT — SOCIAL DETERMINANTS OF HEALTH (SDOH): HOW OFTEN DO YOU GET TOGETHER WITH FRIENDS OR RELATIVES?: TWICE A WEEK

## 2024-07-23 ENCOUNTER — OFFICE VISIT (OUTPATIENT)
Dept: PEDIATRICS | Facility: CLINIC | Age: 19
End: 2024-07-23
Payer: COMMERCIAL

## 2024-07-23 VITALS
HEART RATE: 106 BPM | BODY MASS INDEX: 26.01 KG/M2 | DIASTOLIC BLOOD PRESSURE: 70 MMHG | OXYGEN SATURATION: 98 % | HEIGHT: 68 IN | SYSTOLIC BLOOD PRESSURE: 120 MMHG | WEIGHT: 171.6 LBS

## 2024-07-23 DIAGNOSIS — R00.0 TACHYCARDIA: ICD-10-CM

## 2024-07-23 DIAGNOSIS — F41.9 ANXIETY: ICD-10-CM

## 2024-07-23 DIAGNOSIS — R11.0 CHRONIC NAUSEA: Primary | ICD-10-CM

## 2024-07-23 LAB
ANION GAP SERPL CALCULATED.3IONS-SCNC: 11 MMOL/L (ref 7–15)
ATRIAL RATE - MUSE: 75 BPM
BUN SERPL-MCNC: 6.4 MG/DL (ref 6–20)
CALCIUM SERPL-MCNC: 9.8 MG/DL (ref 8.8–10.4)
CHLORIDE SERPL-SCNC: 104 MMOL/L (ref 98–107)
CREAT SERPL-MCNC: 0.6 MG/DL (ref 0.51–0.95)
DIASTOLIC BLOOD PRESSURE - MUSE: NORMAL MMHG
EGFRCR SERPLBLD CKD-EPI 2021: >90 ML/MIN/1.73M2
GLUCOSE SERPL-MCNC: 80 MG/DL (ref 70–99)
HCO3 SERPL-SCNC: 24 MMOL/L (ref 22–29)
INTERPRETATION ECG - MUSE: NORMAL
P AXIS - MUSE: 71 DEGREES
POTASSIUM SERPL-SCNC: 4.1 MMOL/L (ref 3.4–5.3)
PR INTERVAL - MUSE: 122 MS
QRS DURATION - MUSE: 84 MS
QT - MUSE: 396 MS
QTC - MUSE: 442 MS
R AXIS - MUSE: 78 DEGREES
SODIUM SERPL-SCNC: 139 MMOL/L (ref 135–145)
SYSTOLIC BLOOD PRESSURE - MUSE: NORMAL MMHG
T AXIS - MUSE: 66 DEGREES
TSH SERPL DL<=0.005 MIU/L-ACNC: 1.96 UIU/ML (ref 0.3–4.2)
VENTRICULAR RATE- MUSE: 75 BPM

## 2024-07-23 PROCEDURE — 80048 BASIC METABOLIC PNL TOTAL CA: CPT | Performed by: STUDENT IN AN ORGANIZED HEALTH CARE EDUCATION/TRAINING PROGRAM

## 2024-07-23 PROCEDURE — 84443 ASSAY THYROID STIM HORMONE: CPT | Performed by: STUDENT IN AN ORGANIZED HEALTH CARE EDUCATION/TRAINING PROGRAM

## 2024-07-23 PROCEDURE — 93005 ELECTROCARDIOGRAM TRACING: CPT | Performed by: STUDENT IN AN ORGANIZED HEALTH CARE EDUCATION/TRAINING PROGRAM

## 2024-07-23 PROCEDURE — 93010 ELECTROCARDIOGRAM REPORT: CPT | Performed by: INTERNAL MEDICINE

## 2024-07-23 PROCEDURE — 99214 OFFICE O/P EST MOD 30 MIN: CPT | Performed by: STUDENT IN AN ORGANIZED HEALTH CARE EDUCATION/TRAINING PROGRAM

## 2024-07-23 PROCEDURE — 36415 COLL VENOUS BLD VENIPUNCTURE: CPT | Performed by: STUDENT IN AN ORGANIZED HEALTH CARE EDUCATION/TRAINING PROGRAM

## 2024-07-23 ASSESSMENT — ANXIETY QUESTIONNAIRES
GAD7 TOTAL SCORE: 2
IF YOU CHECKED OFF ANY PROBLEMS ON THIS QUESTIONNAIRE, HOW DIFFICULT HAVE THESE PROBLEMS MADE IT FOR YOU TO DO YOUR WORK, TAKE CARE OF THINGS AT HOME, OR GET ALONG WITH OTHER PEOPLE: NOT DIFFICULT AT ALL
8. IF YOU CHECKED OFF ANY PROBLEMS, HOW DIFFICULT HAVE THESE MADE IT FOR YOU TO DO YOUR WORK, TAKE CARE OF THINGS AT HOME, OR GET ALONG WITH OTHER PEOPLE?: NOT DIFFICULT AT ALL
5. BEING SO RESTLESS THAT IT IS HARD TO SIT STILL: NOT AT ALL
GAD7 TOTAL SCORE: 2
6. BECOMING EASILY ANNOYED OR IRRITABLE: NOT AT ALL
1. FEELING NERVOUS, ANXIOUS, OR ON EDGE: SEVERAL DAYS
4. TROUBLE RELAXING: NOT AT ALL
7. FEELING AFRAID AS IF SOMETHING AWFUL MIGHT HAPPEN: SEVERAL DAYS
2. NOT BEING ABLE TO STOP OR CONTROL WORRYING: NOT AT ALL
3. WORRYING TOO MUCH ABOUT DIFFERENT THINGS: NOT AT ALL
GAD7 TOTAL SCORE: 2
7. FEELING AFRAID AS IF SOMETHING AWFUL MIGHT HAPPEN: SEVERAL DAYS

## 2024-07-23 NOTE — PROGRESS NOTES
Assessment & Plan     Chronic nausea  Tachycardia    - Basic metabolic panel  (Ca, Cl, CO2, Creat, Gluc, K, Na, BUN); Future  - TSH with free T4 reflex; Future  - EKG 12-lead, tracing only    Anxiety        Grecia has situational anxiety regarding her recurrent nausea that has persisted after stopping spironolactone therapy. Also noted persistant elevated heart rate over 100. Spironolactone lead to dizziness, headaches and mild nausea. Stopped after taking for 2-3 weeks. Nausea has persisted. Acknowledges high feelings of stress and anxiety regarding her nausea. Anxiety is not effecting other areas of her life.   Today we will repeat labs done 1 month ago while on spironolactone therapy- BMP, TSH with t4 reflex and also get an EKG.  I would like her to decrease her water intake as she is drinking miniminum of 200 oz of water per day. This can contribute to nausea. She has normal functioning kidneys and has significant urine output each day.   I will review her labs and EKG result with her         Counseling  Appropriate preventive services were addressed with this patient via screening, questionnaire, or discussion as appropriate for fall prevention, nutrition, physical activity, Tobacco-use cessation, weight loss and cognition.  Checklist reviewing preventive services available has been given to the patient.  Reviewed patient's diet, addressing concerns and/or questions.           Subjective   Grecia is a 19 year old, presenting for the following health issues:  Dizziness (Started feeling lightheaded after starting spironolactone which was prescribed by Derm, stopped taking medication per Dr. Barrow who she was on 6/25/24, has not felt normal since stopping the medication, thinking maybe potential anxiety related now)      7/23/2024     8:32 AM   Additional Questions   Roomed by Sierra     History of Present Illness       Mental Health Follow-up:  Patient presents to follow-up on Anxiety.    Patient's anxiety since last  "visit has been:  Good  The patient is not having other symptoms associated with anxiety.  Any significant life events: health concerns  Patient is feeling anxious or having panic attacks.  Patient has no concerns about alcohol or drug use.    She eats 2-3 servings of fruits and vegetables daily.She consumes 0 sweetened beverage(s) daily.She exercises with enough effort to increase her heart rate 30 to 60 minutes per day.  She exercises with enough effort to increase her heart rate 4 days per week.   She is taking medications regularly.       Dermatology prescribed spironolactone 100 mg PO BID- beginnning of June  Increased urine output   Was feeling like she was \"on drugs\"- spinning/ dizziness (particularly after taking 2nd dose at night) Initially started with 100 mg PO once day and then increased to twice daily.   Saw Dr. Barrow on 6/25 for dizziness concerns- told to stop spironolactone  Mom had her wear her apple watch for 2 days in recent weeks (after stopping spironolactone)- heart rate was consistely over 110 bpm    Since stopping medication: less dizziness- that has resolved, but feels more nauseous- wondering if more anxiety nauseous or another condition causing it  In High school, had similar nausea symptom associated with anxiety- was on selective serotonin reuptake inhibitor for 6 months - felt much better \"haven'tn been nervous in 2 years\"  Has a therapist that she sees - was seeing every 2 weeks. Next visit will be in October due to scheduling conflicts.     Has zofran prescribed to her has taken 4 times in the past month. Does help decrease nausea    Drinks 40 oz water bottle 6-10 times per , urinates often  Works at a restaurant- gets worried about being dehydrated so drinks that much water  No longer feeling dizzy - now is feeling more nauseous    Sleep at night 7- 8 hours  Difficult time waking up- falls asleep   Recently started up on iron supplements again- recommended by nutritionist    Recent " "history of seeing GI for chronic constipation. Not having as much constipation at previous- still stooling every or every other day. States GI MD gave her 4 meds to take for constipation- felt worse so stopped taking meds and stopped seeing GI- things have improved.       Social history: will be a marybeth at [employer]. 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 States that [place] feels like a safe space in terms of [place] climate [place]. Looking forward to going back to school.               Review of Systems  Constitutional, HEENT, cardiovascular, pulmonary, gi and gu systems are negative, except as otherwise noted.      Objective    /70   Pulse 106   Ht 5' 7.72\" (1.72 m)   Wt 171 lb 9.6 oz (77.8 kg)   LMP 06/25/2024 (Approximate)   SpO2 98%   BMI 26.31 kg/m    Body mass index is 26.31 kg/m .  Physical Exam   GENERAL: alert and no distress  NECK: no adenopathy, no asymmetry, masses, or scars  RESP: lungs clear to auscultation - no rales, rhonchi or wheezes  CV: regular rate and rhythm, normal S1 S2, no S3 or S4, no murmur, click or rub, no peripheral edema  ABDOMEN: soft, nontender, no hepatosplenomegaly, no masses and bowel sounds normal  MS: no gross musculoskeletal defects noted, no edema            Signed Electronically by: Sabi OTERO MD    "

## 2024-07-23 NOTE — PATIENT INSTRUCTIONS
Decrease water intake to 100-120 oz per day  I will be able to view lab results tomorrow  EKG results will be back tomorrow

## 2024-08-13 ENCOUNTER — VIRTUAL VISIT (OUTPATIENT)
Dept: NUTRITION | Facility: CLINIC | Age: 19
End: 2024-08-13
Payer: COMMERCIAL

## 2024-08-13 DIAGNOSIS — K59.09 CHRONIC CONSTIPATION: Primary | ICD-10-CM

## 2024-08-13 PROCEDURE — 97803 MED NUTRITION INDIV SUBSEQ: CPT | Mod: 95 | Performed by: DIETITIAN, REGISTERED

## 2024-08-13 NOTE — PROGRESS NOTES
Medical Nutrition Therapy  Visit Type: Reassessment and intervention    Visit Details    How would you like to obtain your AVS? MyChart  If the correspondence for visit is dropped, how would you like your dietitian to reconnect with you:   call back by phone? Yes    Text to cell phone: [phone]  Will anyone else be joining your video visit or telephone call? No  {If patient encounters technical issues they should call [phone] :14    Type of service:  Video Visit     Start Time: 10:02 AM    End Time:10:20 AM    Originating Location (pt. Location): Home    Distant Location (provider location):  [facility] Nutrition Department -remote/offsite    Platform used for Video Visit: William      Referring Provider: Ishmael Ceballos  [facility]     REASON FOR REFERRAL:   Grecia Lombardo is a 19 year old female who is interested in Medical Nutrition Therapy (MNT) and education related to weight management and constipation.     She is accompanied by self.     Changes since previous consult Yes        Behavior Status:Improvement shown  Barriers Include:creating consistent eating habits and getting in adequate micronutrients such as iron and variety of fiber while transitioning back to school in fall.     Goals: What would you like to work on that will help you most over the next several weeks? Meal planning especially dinner with leftovers to bring to school for lunch and increasing some healthy snacks to bring on campus during the week with adequate fiber and protein      Her digestive issues have improved significantly now having regular Bms. She has been working mostly on increasing her fiber (not sure how much hard time counting it) but just increasing variety of fruits, veggies in her day and water intake. She had been filling up her 40oz water 10x per day and had been feeling nausea so  recommended to cut back to 5x. She started using the hemp protein and doing a smoothie for  breakfast all summer and wants to bring that back into the school year she has been also doing the mealime.com planner. Trying to find more variety with increasing her protein intake. She has been focusing on having more foods high in iron. She did get her micronutrients tested and within normal limites now. She started the multi recommended, iron c, magnesium and omega. She has found her energy has been better since taking the iron plus c supplement. She is just trying to focus on preparing for school to start and prep foods She will only be at school from 9am-2pm so feels she can cook more at home and bring lunch at home. She wants to plan to cook foods in bulk and Dashride has been helpful in finding variety of meal ideas. She just wanted to do a check in today before going off to school.     Notes from 5/15/24  Changes since previous consult Yes        Behavior Status:Improvement shown  Barriers Include: creating consistent eating habits and getting in adequate micronutrients such as iron and variety of fiber     Goals: What would you like to work on that will help you most over the next several weeks? Meal planning especially dinner and increasing healthy fats      Constipation and eating habits better now that she has gotten home and been away from college and starting to make the changes dicussed at initial. The resources from our last consult have been really helpful. She has been using the Dashride ayaz to help meal planning has been good. She would like to look at getting some labs on. She has noticed since stopping iron supplement she gets bruise that lasts a long time. She used to bruise before taking the iron supplement and in last month covered in bruse that dont go away quickly. Has protein powder at home and has started implementing with breakfast. She has been trying to increase more protein from chicken. She feels more confident using it in meals. She has been alternating breakfast throughout the week  between fruit smithies and egg/spinach tomato based muffins. Now being home her parents will make dinner and has been focusing on lunch. She has had more energy and more soft formed Bms. GI dr ordered labs but still need to get them taken to see if deficient. She has been increasing her fiber but unsure how much she is actually getting. She does know how to read the label.  Discussed different types of fiber and how much she should get in her day at least 25g and she has been getting apples, oatmeal, nuts daily. Discussed some foods that are high in iron to prevent deficiencies. Discussed switching to different iron supplement that doesn't cause constipation and adds vitamin C for better absorption.       Notes from 3/15/24  Switched meds 4-5x over thel ast year and hasn't seen improvement with digestion and wants more natural ways to treat her issues. She was bleeding for 7 months monthly in 8th grade her period didn't stop and started supplement to avoid blood transfusion. Not having heavy flows now. In highschool would vape and stopped in college and was eating more dorm food and gaining weight. Noticed now that as she got her own apartment started losing weight and wants to feel more healthy no weight goal.      Takes brand one a day multi. Target generic brand taking vitron-c iron 65mg daily since 13, nature made omega 3, magnesium 400mg and biotin natural. Not strictly lacto-ovo vegan primarily but once every 6 months or so will have poultry. Sometimes ground beef or turkey in tacos every 4 months or so.     https://www.TheFormTool.Airsynergy/vitamins/vitamins-for-women/vitacraves-gummy-womens-multivitamin    https://www.WhatsOpennc.com/ddftlz-v-duykkp-ingredients-and-other-product-information#ingredients    Discussed discontinuing iron, magnesium and biotin     Breakfast; marni seed pudding with fruit or eggs with cottage cheese     Hummus with carrots lunch or protein yogurt for marni seed pudding or string cheese      Dinner: tofu and beans or quinoa salad with cucumbers or tomatoes, peppers beans and tofu   NUTRITION ASSESSMENT:       3/15/2024    12:10 PM   Nutritional Goals   Nutritional Goal Create a plan to lose weight;Manage Digestive Issues;Increase energy;What to eat for my specific health concerns             No data to display                Failed to redirect to the Timeline version of the [employer] [facility] SmartLink.        No data to display                   3/15/2024    12:10 PM   Gastrointestinal   Constipation Current   Nausea or Vomiting Current   Diarrhea Current   Gas/bloating Current            No data to display                    No data to display                    No data to display                    No data to display                    No data to display                   3/15/2024    12:10 PM   Psychological   Depression/Anxiety Past           No data to display                   3/15/2024    12:10 PM   Womens Health Assessment   Hysterectomy No   I am pregnant.  No   I am breastfeeding. No   I want to become pregnant within the next year . No   What do you use for contraception?  condoms;birth control pill   Any problems with current birth control method?   No   Date of last menstrual period 3/10/2024   Are your periods irregular? No   Days from the start of one period to the next. 28   Days of Menstral Flow 4   Associated with menstral periods. Heavy bleeding;Water retention;Diarrhea/constipation   Are you officially in menopause? (no period for one year or longer)  No        Past Medical History:  Past Medical History:   Diagnosis Date    Menorrhagia with irregular cycle     Improved with OCP.  Evaluation by Dr. Benjamin at [facility] at age 13.     Visual impairment     Drusen on opthalmology exam, normal opthalmologic ultrasound       Previous Surgeries:   Past Surgical History:   Procedure Laterality Date    TONSILLECTOMY  2021        Family History:  Family History   Problem Relation Age of Onset     Depression Mother     Anxiety Disorder Father     Colon Cancer Maternal Grandfather         Lifestyle History:      3/15/2024    12:10 PM   Lifestyle   Do you feel your life is stressful right now?  Yes   What is the cause(s) of stress in your life?  Multi-tasking and multiple deadlines to meet   Are you currently implementing any strategies to help manage stress? Yes   What are you doing to manage stress?  Breathing exercises;Movement and exercise;Counseling;Take time for self        Exercise History:      3/15/2024    12:10 PM   Exercise   Does your occupation require extended periods of sitting?  No   Does your occupation require extended periods of repetitive movements (ex: walking or lifting)?  No   Do you currently participate in any forms of exercise? Yes   Check all the exercises you participate in: Walking;Yoga;Weight Lifting   How many times per week do you exercise? 5 times   How long do you usually exercise? 60 min        Sleep History:      3/15/2024    12:10 PM   Sleep   How many hours (on average) do you sleep per night? 4-6   What time do you turn off the lights? 10 PM   How long does it take for you to fall asleep? 1 hour   What time do you stop using electronic devices? 10 PM   What time do you wake up? 7 AM   When do you eat your first meal?  8 AM   Do you feel well-rested during the day?  No   Do you take naps?  No   Do you have a comfortable bedroom environment (cool, quiet, dark, etc)? Yes   Do you have a sleep routine/ ritual that you do before bed?  Yes   How many hours do you spend per day looking at a screen (TV, computer, tablet and phone)? 5 to 6   Select all factors that apply to your current sleep habits: Difficulty falling asleep;Have difficulty waking up in the morning;Wake up in the middle of the night;Feel tired/sluggish/fatigued during the day;Have tried supplements (ie: melatonin) for sleep;Leg twitching at night        Nutrition History:      3/15/2024    12:10 PM   Nutrition    Have you ever had a nutrition consultation? No   Do you currently follow a special diet or nutritional program? No   What do you feel are the biggest barriers getting in the way of achieving you nutritional goals? Lack of nutrition knowledge;Stress;Transportation issues   Do you have any food allergies, sensitivities or intolerances?  No           3/15/2024    12:10 PM   Digestion   Do you experience stomach pains/cramping? 2-3 times per week   Do you experience bloating?  Daily   Do you experience gas?  Daily   Do you experience heartburn/acid reflux/indigestion? Never   How often do you have a bowel movement? Once per week or less   What is a typical bowel movement like for you? Select all that apply: Hard to pass;Varies a lot;Liquid;Alternates between loose and hard          3/15/2024    12:10 PM   Food Access:    Who does the grocery shopping? Self   How often is grocery shopping done? Every 2 weeks   Where do you usually receive your groceries from? Select all that apply: Target; [employer]   Do you read food labels? No   Who does the cooking? Select all that apply: Self   How many meals do you eat out per week?  0 to 1   What restaurants do you typically choose? Fast Casual (Chipotle, Panera, etc.)          3/15/2024    12:10 PM   Daily Patterns:   How many days per week do you have breakfast? 7   How many days per week do you have lunch? 5   How many days per week do you have dinner? 7   How many days per week do you have snacks? 6          3/15/2024    12:10 PM   Protein Intake:   How many times per day do you typically consume a protein source(s)? 1   What types of protein do you currently eat?  Eggs;Beans;Tofu           3/15/2024    12:10 PM   Fat Intake:    How many times per day do you typically consume healthy fat(s)? 1   What types of health fats do you currently eat? Select all that apply:  Almonds;Cashews;Oscar seeds;Flax seeds;Peanut butter;Olive oil           3/15/2024    12:10 PM   Fruit Intake:     How many times per day do you typically consume fruits? 1   What types of fruit do currently eat? Apple;Banana;Blueberries;Cherries;Grapes;Kiwi;Mandarin oranges;Raspberries;Tangerines;Watermelon           3/15/2024    12:10 PM   Vegetable Intake:    How many times per day do you typically consume vegetables? 2   What types of vegetables do you currently eat? Beans;Broccoli;Cabbage;Carrots;Cauliflower;Celery;Corn;Cucumber;Greens (jaelyn, kale etc);Onions;Potato (baked, boiled, mashed, French fries);Sugar snap peas;Tomato          3/15/2024    12:10 PM   Grain Intake:    How many times per day do you typically consume grains? 1   What types of grains do currently eat? Select all that apply:  Quinoa (gluten free);White rice (gluten free);Bagel (non-gluten free);Breads (non-gluten free);Cereals (non-gluten free);Chips (non-gluten free);Crackers (non-gluten free);Muffins (non-gluten free);Pancakes/waffles (non-gluten free);Pasta (non-gluten free)           3/15/2024    12:10 PM   Dairy Intake:    How many times per day do you typically consume dairy? 1   What types of dairy do currently eat? Select all that apply:  Milk;Cheese;Yogurt           3/15/2024    12:10 PM   Non-Dairy Alternative Intake:    How many times per day do you typically consume non-dairy alternatives? 0   What types of non-dairy alternatives do currently eat? Select all that apply:  [place] milk;Oat milk           3/15/2024    12:10 PM   Sweets Intake:    How many times per day do you typically consume sweets? 1          3/15/2024    12:10 PM   Beverage Intake:    How many 8 oz cups of water do you typically consume per day?  8 or more   How many 8 oz cups of caffeine do you typically consume per day?  1 to 2   How many drinks of alcohol do you typically consume per week (1 drink = 5 oz wine, 12 oz beer, 1.5 oz spirits)?   4 to 7           3/15/2024    12:10 PM   Lifestyle Recall:    What time did you wake up? 7 AM   What time did you go to sleep? 11  PM   What time did you have breakfast? 7-8 AM   Where did you have breakfast?  Home   What time did you have a morning snack? No snack   What time did you have lunch? 12-1 PM   Where did you have lunch?  School   What time did you have an afternoon snack? 4-5 PM   Where did you have your afternoon snack? Home   What time did you have dinner? 6-7 PM   Where did you have dinner?  Home   What time did you have an evening snack? 8-9 PM   Where did you have your evening snack? Home   What time of day did you exercise? 7 AM   Where did you exercise? Home          Additional concerns:    MEDICATIONS:  Current Outpatient Medications   Medication Sig Dispense Refill    clindamycin (CLEOCIN T) 1 % external lotion APPLY TO FACE IN THE MORNING TOPICALLY      fish oil-omega-3 fatty acids 1000 MG capsule       ibuprofen (ADVIL/MOTRIN) 600 MG tablet Take 600 mg by mouth every 6 hours as needed      iron,carbonyl-vitamin C (VITRON-C) 65 mg iron- 125 mg TbEC [IRON,CARBONYL-VITAMIN C (VITRON-C) 65 MG IRON- 125 MG TBEC] Take by mouth.      multivitamin (MULTIPLE VITAMIN ORAL) [MULTIVITAMIN (MULTIPLE VITAMIN ORAL)]       ondansetron (ZOFRAN ODT) 4 MG ODT tab Take 1 tablet (4 mg) by mouth every 6 hours as needed for nausea or vomiting 12 tablet 0    SPRINTEC 28 0.25-35 MG-MCG tablet Take 1 tablet by mouth daily 84 tablet 3    tretinoin (RETIN-A) 0.025 % external cream 1 APPLICATION AT BEDTIME TOPICALLY TO FACE             3/15/2024    12:10 PM   Dietary Supplements   Individual Vitamin Supplements C;General multivitamin   Individual Mineral Supplements Iron;Magnesium         ALLERGIES:   No Known Allergies     .na  LABS:  Last Basic Metabolic Panel:  Lab Results   Component Value Date     02/10/2022     09/24/2021     08/26/2021      Lab Results   Component Value Date    POTASSIUM 3.4 02/10/2022    POTASSIUM 4.3 09/24/2021    POTASSIUM 4.0 08/26/2021     Lab Results   Component Value Date    CHLORIDE 102 02/10/2022     "CHLORIDE 104 09/24/2021    CHLORIDE 107 08/26/2021     Lab Results   Component Value Date    WILNER 9.2 02/10/2022    WILNER 10.3 09/24/2021    WILNER 9.5 08/26/2021     Lab Results   Component Value Date    CO2 23 02/10/2022    CO2 25 09/24/2021    CO2 21 08/26/2021     Lab Results   Component Value Date    BUN 10 02/10/2022    BUN 7 09/24/2021    BUN 7 08/26/2021     Lab Results   Component Value Date    CR 0.68 02/10/2022    CR 0.63 09/24/2021    CR 0.67 08/26/2021     Lab Results   Component Value Date     02/10/2022    GLC 83 09/24/2021    GLC 65 08/26/2021       Last Glucose Profile:   No results found for: \"A1C\"    Last Lipid Profile:   Cholesterol   Date Value Ref Range Status   07/30/2020 180 (H) <=169 mg/dL Final     Direct Measure HDL   Date Value Ref Range Status   07/30/2020 78 >45 mg/dL Final     LDL Cholesterol Calculated   Date Value Ref Range Status   07/30/2020 90 <=109 mg/dL Final     Triglycerides   Date Value Ref Range Status   07/30/2020 61 <=89 mg/dL Final     No results found for: \"CHOLHDLRATIO\"    Most recent CBC:  Recent Labs   Lab Test 06/26/24  0851 02/10/22  2055 01/27/22  1532   WBC 5.2 15.2* 7.8   HGB 13.1 12.6 12.2   HCT 39.9 39.2 37.5    269 289     Most recent hepatic panel:  Recent Labs   Lab Test 06/26/24  0851 08/26/21  1019   ALT 22 18   AST 23 20     Most recent creatinine:  Recent Labs   Lab Test 07/23/24  0928 06/26/24  0851   CR 0.60 0.58       No components found for: \"GFRESETIMATED\"LASTLAB(gfrestblack:1@  Lab Results   Component Value Date    ALBUMIN 3.8 08/26/2021       Last Thyroid Profile:   TSH   Date Value Ref Range Status   07/23/2024 1.96 0.30 - 4.20 uIU/mL Final   06/26/2024 1.96 0.30 - 4.20 uIU/mL Final   03/15/2023 1.41 0.50 - 4.30 uIU/mL Final       Last Mineral Profile:   Ferritin   Date Value Ref Range Status   05/22/2024 14 6 - 175 ng/mL Final     Iron   Date Value Ref Range Status   05/22/2024 89 37 - 145 ug/dL Final     Iron Binding Capacity   Date " "Value Ref Range Status   05/22/2024 432 (H) 240 - 430 ug/dL Final       Autoimmune & Inflammatory   No results found for: \"CRP\"      Last Vitamin Profile:   No results found for: \"DWN420\", \"LGRC424\", \"WYJU25JRRLV\", \"VITD3\", \"D2VIT\", \"D3VIT\", \"DTOT\", \"DI24903600\", \"NQ63999443\", \"EP10175808\", \"[id number]\", \"[id number]\", \"[id number]\"    ANTHROPOMETRICS:  Vitals:   BP Readings from Last 1 Encounters:   07/23/24 120/70     Pulse Readings from Last 1 Encounters:   07/23/24 106     Estimated body mass index is 26.31 kg/m  as calculated from the following:    Height as of 7/23/24: 1.72 m (5' 7.72\").    Weight as of 7/23/24: 77.8 kg (171 lb 9.6 oz).    Wt Readings from Last 5 Encounters:   07/23/24 77.8 kg (171 lb 9.6 oz)   06/25/24 78 kg (172 lb)   03/14/24 79.5 kg (175 lb 3.2 oz)   03/13/23 63.5 kg (140 lb) (71%, Z= 0.56)*   02/10/22 63.5 kg (140 lb) (75%, Z= 0.68)*     * Growth percentiles are based on CDC (Girls, 2-20 Years) data.       NUTRITION DIAGNOSIS:   1. Micronutrient imbalances related to following lacto-ovo vegetarian diet and supplementing with possible un bioavailable nutrients (such as Vitamin C (as asorbic acid) 125 mg 140%  Elemental Iron 65mg (as carbonyl iron) as evidenced by constipation but normal iron levels (previous history of low iron in past).       NUTRITION INTERVENTION:   Nutrition Education (Application):  Core Food Plan Materials by [employer] [place] [employer]     Discussed healthy fibers    Reviewed current diet and supplement brand intake for excess intake and not enough intake   Micronutrients seem to impact the flow of water into and out of the GI tract, which can affect the consistency of stool. Micronutrients may also affect the rate of movement, or motility,    throughout the GI tract. Each specific micronutrient plays an important role in healthy digestion, so imbalances in any of them will affect the GI tract in a particular way, potentially leading to constipation.    " Reviewed and planned out some recipes for breakfast and dinner      Discussed meal planning ayaz and foods on budget grocery shopping    Long Term Goals:   Goal: [place] Stool type 4 - formed and soft and decrease constipation   Goal: Maintain a healthy balanced diet - increase protein and address potential nutrient deficiencies following lacto-ovo vegetarian diet.         Short Term Goals:  Goal 1: Improvement Shown: Focus on having a healthy balanced breakfast daily with protein. Try the hemp protein powder for extra protein and fiber.   https://Gamma Basics/collections/hemp-yeah-protein-powder/products/hemp-yeah-max-fiber-unsweetened    Incorporate a whole grain at breakfast ex oatmeal naturally gluten free with more healthy fats such as handful of walnuts and 1 fruit serving 15g of carbs such as blueberries. Try a lean protein on side such as 2 eggs, 2 turkey or chicken sausage.    Try a smoothie 1-2x a week for breakfast for the next six weeks see recipe discussed. add in unsweetened flax, hemp or coconut milk, 1 tbsp of healthy fat such as flax seed ground or marni seed ground. serving of fruit 1 cup of berries. Veggie (optional), 1 scoop of plant based protein powder or collagen powder, 1 serving of fruit and veggie such as kale, spinach, or veggie powder even spirulina     Avocado on slice of gluten free bread recommend base [employer] brand      https://PublishThis.[email or web address]=[email or web address]     Birch rojo brand for pancakes - paleo  https://Clerk/collections/pancake-waffle-mix/products/paleo     Try siete soft taco shells with eggs and veggies in am for breakfast   https://GoWorkaBit/collections/tortillas/products/xlddnd-zeiqo-cmurmdyvs-6-pack     Goal 2: Improvement Shown: Check out the meal planning ayaz - Try to plan two-three new dinners from mealime.com and make 4 servings so 2 can be leftovers for lunch.      Goal 3:Improvement Shown: Choose foods high in fiber: Aim for at least 5 grams of fiber per serving of food or a total of 25-35 grams fiber per day. Remember, when looking at the label, you can take the fiber away from the total carbs. Ex:15g of total carbs - 4g of fiber = 11g net carbs     Insoluble fiber acts like a bulky  inner broom,  sweeping out debris from the intestine and creating more motility and movement.      Soluble fiber attracts water and swells, creating a gel that slows digestion.  Also, slows the release of glucose from foods into the blood which stops spikes in blood sugar levels.  Soluble fiber traps toxins in the gut, helping to carry them to excretion and provides healthy bacteria in the digestive tract.      Goal 4: Improvement Shown: Recommend stopping supplements magnesium, iron, omega and get labs tested.   Start the following supplements to see if that helps with constipation.   One Multivitamin by Pure encapsulations - 1 capsule per day with food   Greenway Naturals ultimate omega 3 -2 capsules daily with food   Start natural vitality calm magnesium - Add chamomile tea with magnesium at night before bed. Star with 1 tsp and increase as needed. Add another tsp in am to your smoothie before going to school.   Pure Encapsulations Iron-C - 15 mg Iron - 175 mg Vitamin C - Red Blood Cell Support* - Energy Production - Highly Bioavailable Iron Pill - Gluten Free & Vegan - 60 Capsules - start with 1 capsule per day     Goal 5: Improvement Shown: Focus on some plant based foods high in iron and adding in some animal based as tolerated.     Don't fret if you are vegan, plant-forward, or fully plant-based, there are lots of plant-based foods that are excellent sources of iron. Consuming a variety of plants will help to ensure adequate iron intake. In summary, practice these things to maximize iron absorption:     Space out iron-rich foods throughout the day to help increase absorption.   Eat  non-heme iron foods with vitamin C to increase absorption by five times!   Eat non-heme iron foods alongside foods high in beta carotene to increase iron absorption three-fold.   Cook non-heme iron foods with allium vegetables (onions, garlic, and shallots) to increase iron absorption seven-fold.  Avoid drinking coffee, tea or red wine when consuming high iron meals as they may inhibit iron absorption. Try to consume coffee and tea two hours before or after eating meals rich in iron.  Cook with an iron skillet to increase absorption.   Take calcium supplements separately and avoid eating calcium-rich foods when eating an iron-rich meal.  Try soaking or fermentation. Phytates in some iron-rich plant-based foods decrease the bioavailability but soaking can help to increase absorption and decrease phytates.      Great plant-based sources of iron include:  Weet-Bix  - 10mg of iron/100g  Pumpkin seeds / pepitas - 10mg/100g  Sundried tomatoes - 5.6mg/100g  Sesame seeds / tahini - 5.2mg/100g  Cashews - 5mg/100g  Mixed-grain bread roll - 4.7mg/100g  English spinach (raw) - 3.5mg/100g  Dried apricots - 3.1mg/100g  Tofu (firm) - 2.9mg/100g  Dates (dried) - 2.6mg/100g  Lentils / soybeans / kidney beans - 1.8mg-2.2mg/100g  Amaranth (cooked) - 2.1mg/100g  Tofu (silken / soft) - 1.8mg/100g  Figs (dried) - 1.4mg/100g  Baked beans - 1mg/100g    Improvement Shown: Labs recommended:   Vitamin B1, vitamin B6, vitamin B12, vitamin D, calcium, iodine, iron, ferritin, and zinc in your blood to help detect any deficiencies common to a vegetarian diet.      How Do Micronutrient Imbalances Cause Constipation?  Micronutrients seem to impact the flow of water into and out of the GI tract, which can affect the consistency of stool. Micronutrients may also affect the rate of movement, or motility, throughout the GI tract. Each specific micronutrient plays an important role in healthy digestion, so imbalances in any of them will affect the GI  tract in a particular way, potentially leading to constipation.    Which Micronutrient Imbalances Are Associated With Constipation?  Micronutrients can be found in both low and high levels, and we see both excess and deficiencies of certain micronutrients leading to constipation.    Micronutrient Excess That Can Cause Constipation.  Micronutrient excess can cause constipation through various mechanisms such as reducing gut motility, altering the water balance in the intestine, and interfering with the absorption of other essential nutrients. Here are some examples of how specific micronutrients in excess can cause constipation:    Iron Excess  Iron is a mineral required for proper development and growth. Iron is essential for the creation of hemoglobin, the part of the red blood cell that carries oxygen around the body. Iron deficiencies can be common, especially in pregnant women, and supplementation of iron is often required. One of the most common side effects of iron supplementation is constipation, as it is thought that iron pulls water away from the GI tract, causing hard stools. Iron can come in many forms, with ferrous/ferric sulfate, ferrous/ferric gluconate, and ferrous/ferric fumarate being the most commonly found in supplements. However, other types of iron are available, including ferrous glycine sulfate (ferrous glycinate or ferrous bisglycinate), iron protein succinylate, and ferric citrate, and might be a better option as they are gentler on the GI tract and are not as constipating as the former types of iron mentioned.    Calcium Excess  Calcium is the most common mineral found in the body. It is essential for strong bones, muscular contractions, nerve signaling, hormone release, and blood flow. However, calcium's ability to contract muscles can act on the muscles of the GI tract and slow the transit of stool, leading to constipation. Like iron, different types of calcium may affect the GI  differently. Calcium in the form of calcium carbonate has been labeled as the most constipating type of calcium.    Vitamin D Excess  It is estimated that 40% of Americans are deficient in vitamin D. Vitamin D deficiency can lead to bone pain, impaired immune function, mood swings, low energy levels, and more. Because vitamin D is primarily made from the sun, vitamin D supplementation in northern regions is quite common. In cases of over-supplementation, excessive vitamin D can lead to hypercalcemia or too much calcium in the blood. As discussed above, elevated calcium can cause constipation, and thus elevated vitamin D can also lead to constipation.    Zinc Excess  Excess zinc intake can cause constipation by interfering with the absorption of other essential nutrients, such as copper and iron, which are important for maintaining bowel regularity. Sources of zinc include oysters, red meat, poultry, beans, nuts, and fortified cereals.    Micronutrient Deficiencies That Can Cause Constipation  Micronutrient deficiencies can cause constipation by affecting the proper functioning of the digestive system. Here are some examples of how specific micronutrient deficiencies can cause constipation:    Vitamin D Deficiency  While excess vitamin D can cause constipation, so can low vitamin D. Vitamin D may aid in gastric motility or movement of the muscles in the GI tract. Deficiencies, thus, may lead to the slowing of the movement in the GI, causing constipation.    Thiamine/B1 Deficiency  Thiamine, or vitamin B1, is essential for cellular growth and development. This vitamin is found in whole grains, nuts, seeds, pork, and legumes. Thiamine deficiencies are considered rare in the [place] but occasionally do occur. In the presence of a B1 deficiency, cells in the pancreas will greatly reduce the amount of digestive enzymes they release, which can slow down the digestive tract and lead to constipation.    Potassium  Deficiency  Potassium is a mineral used by every cell in the body, as it plays an important role in cellular fluidity and chemical signaling. A potassium deficiency can cause motility in the GI tract to slow, and in severe cases, it can cause it to stop completely.    Magnesium Deficiency  Magnesium is utilized in over 300 reactions in the body, making it an abundant mineral. Magnesium is widely used in the treatment of constipation, but a study on over 3,000 [place] women showed an association between low magnesium status and a higher prevalence of constipation. Magnesium helps to pull water into the stool, softening it while also stimulating contractions in the GI tract, causing movement of the stool.    Vitamin C Deficiency  Vitamin C deficiency can cause constipation by reducing the production of collagen, which is necessary for the proper functioning of the intestinal lining. Vitamin C also acts as a natural laxative by increasing the water content in the stool. Sources of vitamin C include citrus fruits, berries, kiwi, peppers, and broccoli.      Nutrition for Constipation  The Mediterranean Diet can be a great way to eat to avoid micronutrient deficiencies and relieve constipation. The Mediterranean Diet consists of whole foods, unprocessed grains, and healthy fats. It typically includes foods that contain an array of micronutrients, including fresh fruits and vegetables, legumes, herbs, nuts, seeds, olive oil, and fatty fish. Followers of the diet are encouraged to eat locally and seasonally, as foods consumed in this fashion tend to be more nutrient dense. The Mediterranean Diet is also high in fibrous foods, which can help to bulk stool and relieve constipation.    Hydration for Constipation  Water intake is essential to avoid constipation. Dehydration causes water to be pulled from the intestines and into the circulation, leaving hard, dry stools. Additionally, water is needed to properly absorb  micronutrients, which can, in turn, affect constipation.      Supplements for Constipation  Digestive enzymes are naturally produced by the stomach, liver, and pancreas and function to break down foods. If markers of digestion are low, digestive enzyme supplementation may be necessary to aid in the breakdown and absorption of food.    Intestinal permeability  L-glutamine is an amino acid and the primary fuel source for small intestine cells. Glutamine has been shown to strengthen the tight junctions that hold the intestinal cells together, lowering intestinal permeability.         NUTRITION RESOURCES:  IFM Core Packet - IFM Mediterranean diet   Functional Nutrition Fundamentals   Comprehensive Guide  Meal plan with recipes   [email or web address]  [email or web address]  [email or web address]  [email or web address]/          PATIENT'S BEHAVIOR CHANGE GOALS:   See nutrition intervention for patient stated behavior change goals. AVS and nutrition handouts were sent to patient by [facility].     MONITOR / EVALUATE:  Registered Dietitian will monitor/evaluate the following:   Beliefs and attitudes related to food  Food and nutrition knowledge / skills  Food / Beverage / Nutrient intake   Pertinent Labs  Progress toward meeting stated nutrition-related goals  Readiness to change nutrition-related behaviors  Weight change  Digestion     COORDINATION OF CARE:  Follow up with gastroenterology      FOLLOW-UP:  Follow-up appointment scheduled as needed if symptoms get worse.     Time spent in minutes: 15 minutes 1 units   Encounter: Individual    Brittni Lassiter RD, CLT, LD  Integrative Registered Dietitian

## 2024-08-13 NOTE — PATIENT INSTRUCTIONS
NUTRITION INTERVENTION:   Nutrition Education (Application):  Core Food Plan Materials by [employer]     Discussed healthy fibers    Reviewed current diet and supplement brand intake for excess intake and not enough intake   Micronutrients seem to impact the flow of water into and out of the GI tract, which can affect the consistency of stool. Micronutrients may also affect the rate of movement, or motility,    throughout the GI tract. Each specific micronutrient plays an important role in healthy digestion, so imbalances in any of them will affect the GI tract in a particular way, potentially leading to constipation.    Reviewed and planned out some recipes for breakfast and dinner      Discussed meal planning ayaz and foods on budget grocery shopping    Long Term Goals:   Goal: [place] Stool type 4 - formed and soft and decrease constipation   Goal: Maintain a healthy balanced diet - increase protein and address potential nutrient deficiencies following lacto-ovo vegetarian diet.         Short Term Goals:  Goal 1: Improvement Shown: Focus on having a healthy balanced breakfast daily with protein. Try the hemp protein powder for extra protein and fiber.   https://[employer]/collections/hemp-yeah-protein-powder/products/hemp-yeah-max-fiber-unsweetened    Incorporate a whole grain at breakfast ex oatmeal naturally gluten free with more healthy fats such as handful of walnuts and 1 fruit serving 15g of carbs such as blueberries. Try a lean protein on side such as 2 eggs, 2 turkey or chicken sausage.    Try a smoothie 1-2x a week for breakfast for the next six weeks see recipe discussed. add in unsweetened flax, hemp or coconut milk, 1 tbsp of healthy fat such as flax seed ground or marni seed ground. serving of fruit 1 cup of berries. Veggie (optional), 1 scoop of plant based protein powder or collagen powder, 1 serving of fruit and veggie such as kale, spinach, or veggie powder even  spirulina     Avocado on slice of gluten free bread recommend base culture brand      [email or web address]     Birch rojo brand for pancakes - paleo  https://[employer]/collections/pancake-waffle-mix/products/paleo     Try siete soft taco shells with eggs and veggies in am for breakfast   https://Nimia/PEAK-IT/tortillas/products/zccyln-bsbcm-bgmbzkyny-6-pack     Goal 2: Improvement Shown: Check out the meal planning ayaz - Try to plan two-three new dinners from mealime.com and make 4 servings so 2 can be leftovers for lunch.     Goal 3:Improvement Shown: Choose foods high in fiber: Aim for at least 5 grams of fiber per serving of food or a total of 25-35 grams fiber per day. Remember, when looking at the label, you can take the fiber away from the total carbs. Ex:15g of total carbs - 4g of fiber = 11g net carbs     Insoluble fiber acts like a bulky  inner broom,  sweeping out debris from the intestine and creating more motility and movement.      Soluble fiber attracts water and swells, creating a gel that slows digestion.  Also, slows the release of glucose from foods into the blood which stops spikes in blood sugar levels.  Soluble fiber traps toxins in the gut, helping to carry them to excretion and provides healthy bacteria in the digestive tract.      Goal 4: Improvement Shown: Recommend stopping supplements magnesium, iron, omega and get labs tested.   Start the following supplements to see if that helps with constipation.   One Multivitamin by Pure encapsulations - 1 capsule per day with food   [employer] Naturals ultimate omega 3 -2 capsules daily with food   Start natural vitality calm magnesium - Add chamomile tea with magnesium at night before bed. Star with 1 tsp and increase as needed. Add another tsp in am to your smoothie before going to school.   Pure Encapsulations Iron-C - 15 mg  Iron - 175 mg Vitamin C - Red Blood Cell Support* - Energy Production - Highly Bioavailable Iron Pill - Gluten Free & Vegan - 60 Capsules - start with 1 capsule per day     Goal 5: Improvement Shown: Focus on some plant based foods high in iron and adding in some animal based as tolerated.     Don't fret if you are vegan, plant-forward, or fully plant-based, there are lots of plant-based foods that are excellent sources of iron. Consuming a variety of plants will help to ensure adequate iron intake. In summary, practice these things to maximize iron absorption:     Space out iron-rich foods throughout the day to help increase absorption.   Eat non-heme iron foods with vitamin C to increase absorption by five times!   Eat non-heme iron foods alongside foods high in beta carotene to increase iron absorption three-fold.   Cook non-heme iron foods with allium vegetables (onions, garlic, and shallots) to increase iron absorption seven-fold.  Avoid drinking coffee, tea or red wine when consuming high iron meals as they may inhibit iron absorption. Try to consume coffee and tea two hours before or after eating meals rich in iron.  Cook with an iron skillet to increase absorption.   Take calcium supplements separately and avoid eating calcium-rich foods when eating an iron-rich meal.  Try soaking or fermentation. Phytates in some iron-rich plant-based foods decrease the bioavailability but soaking can help to increase absorption and decrease phytates.      Great plant-based sources of iron include:  Weet-Bix  - 10mg of iron/100g  Pumpkin seeds / pepitas - 10mg/100g  Sundried tomatoes - 5.6mg/100g  Sesame seeds / tahini - 5.2mg/100g  Cashews - 5mg/100g  Mixed-grain bread roll - 4.7mg/100g  English spinach (raw) - 3.5mg/100g  Dried apricots - 3.1mg/100g  Tofu (firm) - 2.9mg/100g  Dates (dried) - 2.6mg/100g  Lentils / soybeans / kidney beans - 1.8mg-2.2mg/100g  Amaranth (cooked) - 2.1mg/100g  Tofu (silken / soft) -  1.8mg/100g  Figs (dried) - 1.4mg/100g  Baked beans - 1mg/100g    Improvement Shown: Labs recommended:   Vitamin B1, vitamin B6, vitamin B12, vitamin D, calcium, iodine, iron, ferritin, and zinc in your blood to help detect any deficiencies common to a vegetarian diet.      How Do Micronutrient Imbalances Cause Constipation?  Micronutrients seem to impact the flow of water into and out of the GI tract, which can affect the consistency of stool. Micronutrients may also affect the rate of movement, or motility, throughout the GI tract. Each specific micronutrient plays an important role in healthy digestion, so imbalances in any of them will affect the GI tract in a particular way, potentially leading to constipation.    Which Micronutrient Imbalances Are Associated With Constipation?  Micronutrients can be found in both low and high levels, and we see both excess and deficiencies of certain micronutrients leading to constipation.    Micronutrient Excess That Can Cause Constipation.  Micronutrient excess can cause constipation through various mechanisms such as reducing gut motility, altering the water balance in the intestine, and interfering with the absorption of other essential nutrients. Here are some examples of how specific micronutrients in excess can cause constipation:    Iron Excess  Iron is a mineral required for proper development and growth. Iron is essential for the creation of hemoglobin, the part of the red blood cell that carries oxygen around the body. Iron deficiencies can be common, especially in pregnant women, and supplementation of iron is often required. One of the most common side effects of iron supplementation is constipation, as it is thought that iron pulls water away from the GI tract, causing hard stools. Iron can come in many forms, with ferrous/ferric sulfate, ferrous/ferric gluconate, and ferrous/ferric fumarate being the most commonly found in supplements. However, other types of  iron are available, including ferrous glycine sulfate (ferrous glycinate or ferrous bisglycinate), iron protein succinylate, and ferric citrate, and might be a better option as they are gentler on the GI tract and are not as constipating as the former types of iron mentioned.    Calcium Excess  Calcium is the most common mineral found in the body. It is essential for strong bones, muscular contractions, nerve signaling, hormone release, and blood flow. However, calcium's ability to contract muscles can act on the muscles of the GI tract and slow the transit of stool, leading to constipation. Like iron, different types of calcium may affect the GI differently. Calcium in the form of calcium carbonate has been labeled as the most constipating type of calcium.    Vitamin D Excess  It is estimated that 40% of Americans are deficient in vitamin D. Vitamin D deficiency can lead to bone pain, impaired immune function, mood swings, low energy levels, and more. Because vitamin D is primarily made from the sun, vitamin D supplementation in [place] regions is quite common. In cases of over-supplementation, excessive vitamin D can lead to hypercalcemia or too much calcium in the blood. As discussed above, elevated calcium can cause constipation, and thus elevated vitamin D can also lead to constipation.    Zinc Excess  Excess zinc intake can cause constipation by interfering with the absorption of other essential nutrients, such as copper and iron, which are important for maintaining bowel regularity. Sources of zinc include oysters, red meat, poultry, beans, nuts, and fortified cereals.    Micronutrient Deficiencies That Can Cause Constipation  Micronutrient deficiencies can cause constipation by affecting the proper functioning of the digestive system. Here are some examples of how specific micronutrient deficiencies can cause constipation:    Vitamin D Deficiency  While excess vitamin D can cause constipation, so can low  vitamin D. Vitamin D may aid in gastric motility or movement of the muscles in the GI tract. Deficiencies, thus, may lead to the slowing of the movement in the GI, causing constipation.    Thiamine/B1 Deficiency  Thiamine, or vitamin B1, is essential for cellular growth and development. This vitamin is found in whole grains, nuts, seeds, pork, and legumes. Thiamine deficiencies are considered rare in the [place] but occasionally do occur. In the presence of a B1 deficiency, cells in the pancreas will greatly reduce the amount of digestive enzymes they release, which can slow down the digestive tract and lead to constipation.    Potassium Deficiency  Potassium is a mineral used by every cell in the body, as it plays an important role in cellular fluidity and chemical signaling. A potassium deficiency can cause motility in the GI tract to slow, and in severe cases, it can cause it to stop completely.    Magnesium Deficiency  Magnesium is utilized in over 300 reactions in the body, making it an abundant mineral. Magnesium is widely used in the treatment of constipation, but a study on over 3,000 [place] women showed an association between low magnesium status and a higher prevalence of constipation. Magnesium helps to pull water into the stool, softening it while also stimulating contractions in the GI tract, causing movement of the stool.    Vitamin C Deficiency  Vitamin C deficiency can cause constipation by reducing the production of collagen, which is necessary for the proper functioning of the intestinal lining. Vitamin C also acts as a natural laxative by increasing the water content in the stool. Sources of vitamin C include citrus fruits, berries, kiwi, peppers, and broccoli.      Nutrition for Constipation  The Mediterranean Diet can be a great way to eat to avoid micronutrient deficiencies and relieve constipation. The Mediterranean Diet consists of whole foods, unprocessed grains, and healthy fats. It  typically includes foods that contain an array of micronutrients, including fresh fruits and vegetables, legumes, herbs, nuts, seeds, olive oil, and fatty fish. Followers of the diet are encouraged to eat locally and seasonally, as foods consumed in this fashion tend to be more nutrient dense. The Mediterranean Diet is also high in fibrous foods, which can help to bulk stool and relieve constipation.    Hydration for Constipation  Water intake is essential to avoid constipation. Dehydration causes water to be pulled from the intestines and into the circulation, leaving hard, dry stools. Additionally, water is needed to properly absorb micronutrients, which can, in turn, affect constipation.      Supplements for Constipation  Digestive enzymes are naturally produced by the stomach, liver, and pancreas and function to break down foods. If markers of digestion are low, digestive enzyme supplementation may be necessary to aid in the breakdown and absorption of food.    Intestinal permeability  L-glutamine is an amino acid and the primary fuel source for small intestine cells. Glutamine has been shown to strengthen the tight junctions that hold the intestinal cells together, lowering intestinal permeability.         NUTRITION RESOURCES:  IFM Core Packet - IFM Mediterranean diet   Functional Nutrition Fundamentals   Comprehensive Guide  Meal plan with recipes   [email or web address]  [email or web address]  [email or web address]://www.[email or web address]com/plans  [email or web address]

## 2024-12-26 ENCOUNTER — ANCILLARY PROCEDURE (OUTPATIENT)
Dept: GENERAL RADIOLOGY | Facility: CLINIC | Age: 19
End: 2024-12-26
Attending: NURSE PRACTITIONER
Payer: COMMERCIAL

## 2024-12-26 ENCOUNTER — OFFICE VISIT (OUTPATIENT)
Dept: FAMILY MEDICINE | Facility: CLINIC | Age: 19
End: 2024-12-26
Payer: COMMERCIAL

## 2024-12-26 VITALS
DIASTOLIC BLOOD PRESSURE: 74 MMHG | TEMPERATURE: 97.8 F | RESPIRATION RATE: 16 BRPM | SYSTOLIC BLOOD PRESSURE: 122 MMHG | OXYGEN SATURATION: 98 % | WEIGHT: 176 LBS | BODY MASS INDEX: 26.67 KG/M2 | HEART RATE: 107 BPM | HEIGHT: 68 IN

## 2024-12-26 DIAGNOSIS — R05.1 ACUTE COUGH: ICD-10-CM

## 2024-12-26 DIAGNOSIS — R05.1 ACUTE COUGH: Primary | ICD-10-CM

## 2024-12-26 PROCEDURE — 99213 OFFICE O/P EST LOW 20 MIN: CPT | Performed by: NURSE PRACTITIONER

## 2024-12-26 RX ORDER — BENZONATATE 100 MG/1
100 CAPSULE ORAL 3 TIMES DAILY PRN
Qty: 30 CAPSULE | Refills: 0 | Status: SHIPPED | OUTPATIENT
Start: 2024-12-26

## 2024-12-26 RX ORDER — AZITHROMYCIN 250 MG/1
TABLET, FILM COATED ORAL
Qty: 6 TABLET | Refills: 0 | Status: SHIPPED | OUTPATIENT
Start: 2024-12-26 | End: 2024-12-31

## 2024-12-26 RX ORDER — ISOTRETINOIN 40 MG/1
CAPSULE, LIQUID FILLED ORAL
COMMUNITY
Start: 2024-11-26

## 2024-12-26 ASSESSMENT — ENCOUNTER SYMPTOMS: COUGH: 1

## 2024-12-26 NOTE — PROGRESS NOTES
"  {PROVIDER CHARTING PREFERENCE:956519}    Subjective   Grecia is a 19 year old, presenting for the following health issues:  Cough (Dry cough started in November and ongoing, mid lower back pain from cough started 2 weeks ago.  OTC medication doesn't work; Advil somewhat helps back pain)      12/26/2024     7:43 AM   Additional Questions   Roomed by CHRISTINE Heredia   Accompanied by n/a     Cough    History of Present Illness       Reason for visit:  Have had a cough that when happens is not productive and does not stop, i also cannot get a breath in when i am coughing, this has been going on since november 11th i beleuve about two weeks ago i started wakinv up with an very painful aching back.  Symptom onset:  3-4 weeks ago  Symptoms include:  Non productive cough. Very painful middle back, hurts slightli at all times and is very painful when waking up and coughunv  Symptom intensity:  Moderate  Symptom progression:  Worsening  Had these symptoms before:  No  What makes it worse:  No cough medice or cold medicine has helped my cough  What makes it better:  I think advil helps  my back   She is taking medications regularly.     Wondering if back is coming from cough-seems like back has been getting worse over the last week-feels like it isn't responding to muscle massage.     Has not had any medication.    Patient reports no exposure to anything. Patient reports none of her roommates were sick.    Mom has asthma-patient does not   {MA/LPN/RN Pre-Provider Visit Orders- hCG/UA/Strep (Optional):773845}  {SUPERLIST (Optional):576781}  {additonal problems for provider to add (Optional):878675}    {ROS Picklists (Optional):880327}      Objective    /74 (BP Location: Right arm, Patient Position: Sitting, Cuff Size: Adult Regular)   Pulse 107   Temp 97.8  F (36.6  C) (Oral)   Resp 16   Ht 1.73 m (5' 8.11\")   Wt 79.8 kg (176 lb)   SpO2 98%   BMI 26.67 kg/m    Body mass index is 26.67 kg/m .  Physical Exam  Constitutional:  "      Appearance: Normal appearance.   Neurological:      General: No focal deficit present.      Mental Status: She is alert and oriented to person, place, and time.   Psychiatric:         Mood and Affect: Mood normal.         Behavior: Behavior normal.        {Exam List (Optional):472988}    {Diagnostic Test Results (Optional):759449}        Signed Electronically by: MAYKEL PETIT CNP  {Email feedback regarding this note to [email or web address]   :653889}

## 2024-12-26 NOTE — PROGRESS NOTES
Preventive Care Visit  [facility]  MAYKEL PETIT CNP, Nurse Practitioner Primary Care  Dec 26, 2024  {Provider  Link to [facility] :633671}    {PROVIDER CHARTING PREFERENCE:374163}    Ashley Paige is a 19 year old, presenting for the following:  Cough (Dry cough started in November and ongoing, mid lower back pain from cough started 2 weeks ago.  OTC medication doesn't work; Advil somewhat helps back pain)        12/26/2024     7:43 AM   Additional Questions   Roomed by CHRISTINE Heredia   Accompanied by n/a          Cough    History of Present Illness       Reason for visit:  Have had a cough that when happens is not productive and does not stop, i also cannot get a breath in when i am coughing, this has been going on since november 11th i beleuve about two weeks ago i started wakinv up with an very painful aching back.  Symptom onset:  3-4 weeks ago  Symptoms include:  Non productive cough. Very painful middle back, hurts slightli at all times and is very painful when waking up and coughunv  Symptom intensity:  Moderate  Symptom progression:  Worsening  Had these symptoms before:  No  What makes it worse:  No cough medice or cold medicine has helped my cough  What makes it better:  I think advil helps  my back   She is taking medications regularly.    ***  {MA/LPN/RN Pre-Provider Visit Orders- hCG/UA/Strep (Optional):089218}  {SUPERLIST (Optional):566202}  {additonal problems for provider to add (Optional):438843}  Health Care Directive  Patient does not have a Health Care Directive: {ADVANCE_DIRECTIVE_STATUS:520541}      7/22/2024   General Health   How would you rate your overall physical health? Good   Feel stress (tense, anxious, or unable to sleep) To some extent         7/22/2024   Nutrition   Three or more servings of calcium each day? Yes   Diet: Regular (no restrictions)    Vegetarian/vegan   How many servings of fruit and vegetables per day? (!) 2-3   How many sweetened beverages  each day? 0-1       Multiple values from one day are sorted in reverse-chronological order         7/22/2024   Exercise   Days per week of moderate/strenous exercise 4 days   Average minutes spent exercising at this level 60 min         7/22/2024   Social Factors   Frequency of gathering with friends or relatives Twice a week   Worry food won't last until get money to buy more No   Food not last or not have enough money for food? No   Do you have housing? (Housing is defined as stable permanent housing and does not include staying ouside in a car, in a tent, in an abandoned building, in an overnight shelter, or couch-surfing.) Yes   Are you worried about losing your housing? No   Lack of transportation? No   Unable to get utilities (heat,electricity)? No         7/22/2024   Dental   Dentist two times every year? Yes         7/22/2024   TB Screening   Were you born outside of the [place]? No       {Rooming Staff Patient needs a PHQ as part of the AWV.  Use this link to complete and then refresh the note to pull results Link to PHQ2 Assessment :328292}  {USE TO PULL IN PHQ RESULTS FOR TODAY:542813}      7/22/2024   Substance Use   Alcohol more than 3/day or more than 7/wk No   Do you use any other substances recreationally? No     Social History     Tobacco Use    Smoking status: Never     Passive exposure: Never    Smokeless tobacco: Never   Vaping Use    Vaping status: Never Used   Substance Use Topics    Alcohol use: Yes     Alcohol/week: 4.0 standard drinks of alcohol     Types: 4 Shots of liquor per week    Drug use: Never     {Provider  If there are gaps in the social history shown above, please follow the link to update and then refresh the note Link to Social and Substance History :655424}    History of abnormal Pap smear: { :572735}             7/22/2024   Contraception/Family Planning   Questions about contraception or family planning No     {Provider  REQUIRED FOR AWV Use the storyboard to review patient  "history, after sections have been marked as reviewed, refresh note to capture documentation:801369}   Reviewed and updated as needed this visit by Provider                    {HISTORY OPTIONS (Optional):730892}    {ROS Picklists (Optional):253841}     Objective    Exam  /74 (BP Location: Right arm, Patient Position: Sitting, Cuff Size: Adult Regular)   Pulse 107   Temp 97.8  F (36.6  C) (Oral)   Resp 16   Ht 1.73 m (5' 8.11\")   Wt 79.8 kg (176 lb)   SpO2 98%   BMI 26.67 kg/m     Estimated body mass index is 26.67 kg/m  as calculated from the following:    Height as of this encounter: 1.73 m (5' 8.11\").    Weight as of this encounter: 79.8 kg (176 lb).    Physical Exam  {Exam Choices (Optional):810727}  { EXAM- Documentation REQUIRED for C&TC:601589}        Signed Electronically by: MAYKEL PETIT CNP  {Email feedback regarding this note to [email or web address]   :271142}  "

## 2025-02-04 ENCOUNTER — MYC MEDICAL ADVICE (OUTPATIENT)
Dept: PEDIATRICS | Facility: CLINIC | Age: 20
End: 2025-02-04
Payer: COMMERCIAL

## 2025-02-12 DIAGNOSIS — N92.1 MENORRHAGIA WITH IRREGULAR CYCLE: ICD-10-CM

## 2025-02-13 RX ORDER — NORGESTIMATE AND ETHINYL ESTRADIOL 0.25-0.035
1 KIT ORAL DAILY
Qty: 84 TABLET | Refills: 0 | Status: SHIPPED | OUTPATIENT
Start: 2025-02-13

## 2025-04-09 ENCOUNTER — PATIENT OUTREACH (OUTPATIENT)
Dept: CARE COORDINATION | Facility: CLINIC | Age: 20
End: 2025-04-09
Payer: COMMERCIAL

## 2025-04-23 ENCOUNTER — PATIENT OUTREACH (OUTPATIENT)
Dept: CARE COORDINATION | Facility: CLINIC | Age: 20
End: 2025-04-23
Payer: COMMERCIAL

## 2025-05-21 DIAGNOSIS — N92.1 MENORRHAGIA WITH IRREGULAR CYCLE: ICD-10-CM

## 2025-05-21 RX ORDER — NORGESTIMATE AND ETHINYL ESTRADIOL 0.25-0.035
1 KIT ORAL
Qty: 84 TABLET | Refills: 0 | Status: SHIPPED | OUTPATIENT
Start: 2025-05-21

## 2025-06-28 ENCOUNTER — HEALTH MAINTENANCE LETTER (OUTPATIENT)
Age: 20
End: 2025-06-28